# Patient Record
Sex: FEMALE | Race: OTHER | HISPANIC OR LATINO | Employment: UNEMPLOYED | URBAN - METROPOLITAN AREA
[De-identification: names, ages, dates, MRNs, and addresses within clinical notes are randomized per-mention and may not be internally consistent; named-entity substitution may affect disease eponyms.]

---

## 2023-03-27 PROBLEM — I10 HYPERTENSION: Status: ACTIVE | Noted: 2023-03-27

## 2023-03-27 PROBLEM — F32.A DEPRESSION: Status: ACTIVE | Noted: 2023-03-27

## 2023-04-18 ENCOUNTER — PATIENT MESSAGE (OUTPATIENT)
Dept: ADMINISTRATIVE | Facility: HOSPITAL | Age: 48
End: 2023-04-18

## 2023-05-18 PROBLEM — R31.29 HEMATURIA, MICROSCOPIC: Status: ACTIVE | Noted: 2023-05-18

## 2023-05-18 PROBLEM — R30.0 DYSURIA: Status: ACTIVE | Noted: 2023-05-18

## 2023-08-23 DIAGNOSIS — U07.1 COVID-19 VIRUS DETECTED: ICD-10-CM

## 2023-09-03 PROBLEM — R74.01 TRANSAMINITIS: Status: ACTIVE | Noted: 2023-09-03

## 2023-09-03 PROBLEM — Z87.442 HISTORY OF NEPHROLITHIASIS: Status: ACTIVE | Noted: 2023-09-03

## 2023-09-03 PROBLEM — N12 PYELONEPHRITIS: Status: ACTIVE | Noted: 2023-09-03

## 2023-09-03 PROBLEM — N20.0 BILATERAL NEPHROLITHIASIS: Status: ACTIVE | Noted: 2023-09-03

## 2023-09-03 PROBLEM — R30.0 DYSURIA: Status: RESOLVED | Noted: 2023-05-18 | Resolved: 2023-09-03

## 2023-09-05 PROBLEM — F55.8 ACETAMINOPHEN ABUSE: Status: ACTIVE | Noted: 2023-09-05

## 2023-09-06 PROBLEM — F55.8 ACETAMINOPHEN ABUSE: Status: RESOLVED | Noted: 2023-09-05 | Resolved: 2023-09-06

## 2023-09-06 PROBLEM — R78.81 BACTEREMIA: Status: ACTIVE | Noted: 2023-09-06

## 2023-10-27 ENCOUNTER — HOSPITAL ENCOUNTER (OUTPATIENT)
Facility: HOSPITAL | Age: 48
Discharge: HOME | End: 2023-10-29
Attending: STUDENT IN AN ORGANIZED HEALTH CARE EDUCATION/TRAINING PROGRAM | Admitting: STUDENT IN AN ORGANIZED HEALTH CARE EDUCATION/TRAINING PROGRAM

## 2023-10-27 DIAGNOSIS — R07.9 CHEST PAIN: ICD-10-CM

## 2023-10-27 DIAGNOSIS — R31.29 HEMATURIA, MICROSCOPIC: ICD-10-CM

## 2023-10-27 DIAGNOSIS — E66.09 CLASS 1 OBESITY DUE TO EXCESS CALORIES WITHOUT SERIOUS COMORBIDITY WITH BODY MASS INDEX (BMI) OF 30.0 TO 30.9 IN ADULT: ICD-10-CM

## 2023-10-27 DIAGNOSIS — R94.31 QT PROLONGATION: ICD-10-CM

## 2023-10-27 DIAGNOSIS — I10 HYPERTENSION, UNSPECIFIED TYPE: ICD-10-CM

## 2023-10-27 DIAGNOSIS — R74.01 TRANSAMINITIS: ICD-10-CM

## 2023-10-27 DIAGNOSIS — N20.0 NEPHROLITHIASIS: Primary | ICD-10-CM

## 2023-10-27 PROBLEM — E66.9 CLASS 1 OBESITY WITHOUT SERIOUS COMORBIDITY WITH BODY MASS INDEX (BMI) OF 30.0 TO 30.9 IN ADULT: Status: ACTIVE | Noted: 2023-10-27

## 2023-10-27 PROBLEM — E66.811 CLASS 1 OBESITY WITHOUT SERIOUS COMORBIDITY WITH BODY MASS INDEX (BMI) OF 30.0 TO 30.9 IN ADULT: Status: ACTIVE | Noted: 2023-10-27

## 2023-10-27 LAB
ALBUMIN SERPL BCP-MCNC: 3.8 G/DL (ref 3.5–5.2)
ALP SERPL-CCNC: 128 U/L (ref 55–135)
ALT SERPL W/O P-5'-P-CCNC: 110 U/L (ref 10–44)
ANION GAP SERPL CALC-SCNC: 9 MMOL/L (ref 8–16)
AST SERPL-CCNC: 69 U/L (ref 10–40)
BASOPHILS # BLD AUTO: 0.01 K/UL (ref 0–0.2)
BASOPHILS NFR BLD: 0.1 % (ref 0–1.9)
BILIRUB SERPL-MCNC: 0.8 MG/DL (ref 0.1–1)
BUN SERPL-MCNC: 11 MG/DL (ref 6–20)
CALCIUM SERPL-MCNC: 9.1 MG/DL (ref 8.7–10.5)
CHLORIDE SERPL-SCNC: 106 MMOL/L (ref 95–110)
CO2 SERPL-SCNC: 25 MMOL/L (ref 23–29)
CREAT SERPL-MCNC: 1 MG/DL (ref 0.5–1.4)
DIFFERENTIAL METHOD: ABNORMAL
EOSINOPHIL # BLD AUTO: 0.1 K/UL (ref 0–0.5)
EOSINOPHIL NFR BLD: 1.1 % (ref 0–8)
ERYTHROCYTE [DISTWIDTH] IN BLOOD BY AUTOMATED COUNT: 12.7 % (ref 11.5–14.5)
EST. GFR  (NO RACE VARIABLE): >60 ML/MIN/1.73 M^2
GLUCOSE SERPL-MCNC: 105 MG/DL (ref 70–110)
HAV IGM SERPL QL IA: NORMAL
HBV CORE IGM SERPL QL IA: NORMAL
HBV SURFACE AG SERPL QL IA: NORMAL
HCT VFR BLD AUTO: 39.8 % (ref 37–48.5)
HCV AB SERPL QL IA: NORMAL
HGB BLD-MCNC: 13 G/DL (ref 12–16)
IMM GRANULOCYTES # BLD AUTO: 0.03 K/UL (ref 0–0.04)
IMM GRANULOCYTES NFR BLD AUTO: 0.4 % (ref 0–0.5)
LYMPHOCYTES # BLD AUTO: 1.5 K/UL (ref 1–4.8)
LYMPHOCYTES NFR BLD: 18.5 % (ref 18–48)
MCH RBC QN AUTO: 29 PG (ref 27–31)
MCHC RBC AUTO-ENTMCNC: 32.7 G/DL (ref 32–36)
MCV RBC AUTO: 89 FL (ref 82–98)
MONOCYTES # BLD AUTO: 0.7 K/UL (ref 0.3–1)
MONOCYTES NFR BLD: 8.8 % (ref 4–15)
NEUTROPHILS # BLD AUTO: 5.8 K/UL (ref 1.8–7.7)
NEUTROPHILS NFR BLD: 71.1 % (ref 38–73)
NRBC BLD-RTO: 0 /100 WBC
PLATELET # BLD AUTO: 219 K/UL (ref 150–450)
PMV BLD AUTO: 9 FL (ref 9.2–12.9)
POTASSIUM SERPL-SCNC: 4.2 MMOL/L (ref 3.5–5.1)
PROT SERPL-MCNC: 7.5 G/DL (ref 6–8.4)
RBC # BLD AUTO: 4.49 M/UL (ref 4–5.4)
SODIUM SERPL-SCNC: 140 MMOL/L (ref 136–145)
WBC # BLD AUTO: 8.16 K/UL (ref 3.9–12.7)

## 2023-10-27 PROCEDURE — 80074 ACUTE HEPATITIS PANEL: CPT | Performed by: STUDENT IN AN ORGANIZED HEALTH CARE EDUCATION/TRAINING PROGRAM

## 2023-10-27 PROCEDURE — 85025 COMPLETE CBC W/AUTO DIFF WBC: CPT | Performed by: STUDENT IN AN ORGANIZED HEALTH CARE EDUCATION/TRAINING PROGRAM

## 2023-10-27 PROCEDURE — 36415 COLL VENOUS BLD VENIPUNCTURE: CPT | Performed by: STUDENT IN AN ORGANIZED HEALTH CARE EDUCATION/TRAINING PROGRAM

## 2023-10-27 PROCEDURE — 25000003 PHARM REV CODE 250: Performed by: UROLOGY

## 2023-10-27 PROCEDURE — G0378 HOSPITAL OBSERVATION PER HR: HCPCS

## 2023-10-27 PROCEDURE — 63600175 PHARM REV CODE 636 W HCPCS: Performed by: STUDENT IN AN ORGANIZED HEALTH CARE EDUCATION/TRAINING PROGRAM

## 2023-10-27 PROCEDURE — 96374 THER/PROPH/DIAG INJ IV PUSH: CPT | Mod: 59

## 2023-10-27 PROCEDURE — 96361 HYDRATE IV INFUSION ADD-ON: CPT

## 2023-10-27 PROCEDURE — 80053 COMPREHEN METABOLIC PANEL: CPT | Performed by: STUDENT IN AN ORGANIZED HEALTH CARE EDUCATION/TRAINING PROGRAM

## 2023-10-27 PROCEDURE — G0379 DIRECT REFER HOSPITAL OBSERV: HCPCS

## 2023-10-27 PROCEDURE — 25000003 PHARM REV CODE 250: Performed by: STUDENT IN AN ORGANIZED HEALTH CARE EDUCATION/TRAINING PROGRAM

## 2023-10-27 PROCEDURE — 99204 OFFICE O/P NEW MOD 45 MIN: CPT | Mod: ,,, | Performed by: UROLOGY

## 2023-10-27 PROCEDURE — 96375 TX/PRO/DX INJ NEW DRUG ADDON: CPT

## 2023-10-27 PROCEDURE — 99204 PR OFFICE/OUTPT VISIT, NEW, LEVL IV, 45-59 MIN: ICD-10-PCS | Mod: ,,, | Performed by: UROLOGY

## 2023-10-27 RX ORDER — KETOROLAC TROMETHAMINE 30 MG/ML
15 INJECTION, SOLUTION INTRAMUSCULAR; INTRAVENOUS ONCE
Status: COMPLETED | OUTPATIENT
Start: 2023-10-27 | End: 2023-10-27

## 2023-10-27 RX ORDER — SODIUM CHLORIDE 9 MG/ML
INJECTION, SOLUTION INTRAVENOUS CONTINUOUS
Status: DISCONTINUED | OUTPATIENT
Start: 2023-10-27 | End: 2023-10-29 | Stop reason: HOSPADM

## 2023-10-27 RX ORDER — NALOXONE HCL 0.4 MG/ML
0.02 VIAL (ML) INJECTION
Status: DISCONTINUED | OUTPATIENT
Start: 2023-10-27 | End: 2023-10-29 | Stop reason: HOSPADM

## 2023-10-27 RX ORDER — IBUPROFEN 200 MG
16 TABLET ORAL
Status: DISCONTINUED | OUTPATIENT
Start: 2023-10-27 | End: 2023-10-29 | Stop reason: HOSPADM

## 2023-10-27 RX ORDER — HYDROCODONE BITARTRATE AND ACETAMINOPHEN 5; 325 MG/1; MG/1
1 TABLET ORAL EVERY 6 HOURS PRN
Status: DISCONTINUED | OUTPATIENT
Start: 2023-10-27 | End: 2023-10-28

## 2023-10-27 RX ORDER — HYDROMORPHONE HYDROCHLORIDE 1 MG/ML
1 INJECTION, SOLUTION INTRAMUSCULAR; INTRAVENOUS; SUBCUTANEOUS ONCE
Status: COMPLETED | OUTPATIENT
Start: 2023-10-27 | End: 2023-10-27

## 2023-10-27 RX ORDER — TAMSULOSIN HYDROCHLORIDE 0.4 MG/1
0.4 CAPSULE ORAL DAILY
Status: DISCONTINUED | OUTPATIENT
Start: 2023-10-27 | End: 2023-10-29 | Stop reason: HOSPADM

## 2023-10-27 RX ORDER — SODIUM CHLORIDE 0.9 % (FLUSH) 0.9 %
10 SYRINGE (ML) INJECTION EVERY 12 HOURS PRN
Status: DISCONTINUED | OUTPATIENT
Start: 2023-10-27 | End: 2023-10-29 | Stop reason: HOSPADM

## 2023-10-27 RX ORDER — HYDROCODONE BITARTRATE AND ACETAMINOPHEN 10; 325 MG/1; MG/1
1 TABLET ORAL EVERY 6 HOURS PRN
Status: DISCONTINUED | OUTPATIENT
Start: 2023-10-27 | End: 2023-10-28

## 2023-10-27 RX ORDER — FLUOXETINE HYDROCHLORIDE 20 MG/1
20 CAPSULE ORAL DAILY
Status: DISCONTINUED | OUTPATIENT
Start: 2023-10-28 | End: 2023-10-29 | Stop reason: HOSPADM

## 2023-10-27 RX ORDER — IBUPROFEN 200 MG
24 TABLET ORAL
Status: DISCONTINUED | OUTPATIENT
Start: 2023-10-27 | End: 2023-10-29 | Stop reason: HOSPADM

## 2023-10-27 RX ORDER — HYDRALAZINE HYDROCHLORIDE 20 MG/ML
10 INJECTION INTRAMUSCULAR; INTRAVENOUS EVERY 4 HOURS PRN
Status: DISCONTINUED | OUTPATIENT
Start: 2023-10-27 | End: 2023-10-29 | Stop reason: HOSPADM

## 2023-10-27 RX ORDER — GLUCAGON 1 MG
1 KIT INJECTION
Status: DISCONTINUED | OUTPATIENT
Start: 2023-10-27 | End: 2023-10-29 | Stop reason: HOSPADM

## 2023-10-27 RX ADMIN — KETOROLAC TROMETHAMINE 15 MG: 30 INJECTION, SOLUTION INTRAMUSCULAR; INTRAVENOUS at 10:10

## 2023-10-27 RX ADMIN — HYDROCODONE BITARTRATE AND ACETAMINOPHEN 1 TABLET: 10; 325 TABLET ORAL at 05:10

## 2023-10-27 RX ADMIN — SODIUM CHLORIDE: 9 INJECTION, SOLUTION INTRAVENOUS at 12:10

## 2023-10-27 RX ADMIN — SODIUM CHLORIDE: 9 INJECTION, SOLUTION INTRAVENOUS at 10:10

## 2023-10-27 RX ADMIN — HYDROCODONE BITARTRATE AND ACETAMINOPHEN 1 TABLET: 10; 325 TABLET ORAL at 10:10

## 2023-10-27 RX ADMIN — TAMSULOSIN HYDROCHLORIDE 0.4 MG: 0.4 CAPSULE ORAL at 12:10

## 2023-10-27 RX ADMIN — HYDROMORPHONE HYDROCHLORIDE 1 MG: 1 INJECTION, SOLUTION INTRAMUSCULAR; INTRAVENOUS; SUBCUTANEOUS at 11:10

## 2023-10-27 NOTE — ASSESSMENT & PLAN NOTE
Patient has persistent depression which is mild and is currently controlled. Will Continue anti-depressant medications. We will not consult psychiatry at this time. Patient does not display psychosis at this time. Continue to monitor closely and adjust plan of care as needed.    -Resume home fluoxetine 20mg

## 2023-10-27 NOTE — ASSESSMENT & PLAN NOTE
-home meds: lisinopril-HCTZ 20-12.5mg  -BP controlled at this time  -Will hold home meds.  -Monitor BP

## 2023-10-27 NOTE — ASSESSMENT & PLAN NOTE
Body mass index is 30.03 kg/m². Morbid obesity complicates all aspects of disease management from diagnostic modalities to treatment. Weight loss encouraged and health benefits explained to patient.

## 2023-10-27 NOTE — SUBJECTIVE & OBJECTIVE
Past Medical History:   Diagnosis Date    GERD (gastroesophageal reflux disease)     History of COVID-19     Hypertension     Nephrolithiasis     Renal disorder     Kidney Stones       Past Surgical History:   Procedure Laterality Date    BILATERAL TUBAL LIGATION       SECTION      CHOLECYSTECTOMY      COLONOSCOPY N/A 2023    Procedure: COLONOSCOPY;  Surgeon: Tristen Manrique MD;  Location: [facility];  Service: Endoscopy;  Laterality: N/A;    EXTRACORPOREAL SHOCK WAVE LITHOTRIPSY Right 2022    Procedure: LITHOTRIPSY, ESWL;  Surgeon: Piter Mendieta II, MD;  Location: [facility];  Service: Urology;  Laterality: Right;    TUBAL LIGATION         Review of patient's allergies indicates:   Allergen Reactions    Ampicillin     Penicillins Other (See Comments)     weakness       Family History       Problem Relation (Age of Onset)    Endometrial cancer Mother    Hypertension Sister    No Known Problems Father    Ovarian cancer Maternal Aunt    Stomach cancer Mother            Tobacco Use    Smoking status: Never    Smokeless tobacco: Never   Substance and Sexual Activity    Alcohol use: No    Drug use: No    Sexual activity: Not Currently       Review of Systems   Constitutional:  Negative for chills, fatigue and fever.   HENT:  Negative for congestion.    Respiratory:  Negative for chest tightness and shortness of breath.    Cardiovascular:  Negative for chest pain.   Gastrointestinal:  Positive for nausea and vomiting. Negative for abdominal distention, constipation and diarrhea.   Genitourinary:  Positive for flank pain. Negative for difficulty urinating, dysuria, frequency, hematuria, pelvic pain and urgency.   Musculoskeletal:  Negative for arthralgias.   Skin:  Negative for rash.   Neurological:  Negative for dizziness.   Psychiatric/Behavioral:  Negative for confusion.        Objective:     Temp:  [97.8 °F (36.6 °C)-100.1 °F (37.8 °C)] 97.8 °F (36.6 °C)  Pulse:  [] 86  Resp:  [12-22]  "20  SpO2:  [95 %-99 %] 96 %  BP: (130-183)/() 145/63  Weight: 72.1 kg (158 lb 15.2 oz)  Body mass index is 30.03 kg/m².           Drains       None                    Physical Exam  Vitals and nursing note reviewed.   Constitutional:       Appearance: She is well-developed.   HENT:      Head: Normocephalic.   Eyes:      Conjunctiva/sclera: Conjunctivae normal.   Neck:      Thyroid: No thyromegaly.      Trachea: No tracheal deviation.   Cardiovascular:      Rate and Rhythm: Normal rate.      Pulses: Normal pulses.      Heart sounds: Normal heart sounds.   Pulmonary:      Effort: Pulmonary effort is normal. No respiratory distress.      Breath sounds: Normal breath sounds. No wheezing.   Abdominal:      General: There is no distension.      Palpations: Abdomen is soft. There is no mass.      Tenderness: There is no abdominal tenderness. There is no guarding or rebound.      Hernia: No hernia is present.   Musculoskeletal:         General: No tenderness. Normal range of motion.      Cervical back: Normal range of motion.   Lymphadenopathy:      Cervical: No cervical adenopathy.   Skin:     General: Skin is warm and dry.      Findings: No erythema or rash.   Neurological:      Mental Status: She is alert and oriented to person, place, and time.   Psychiatric:         Behavior: Behavior normal.         Thought Content: Thought content normal.         Judgment: Judgment normal.          Significant Labs:    BMP:  Recent Labs   Lab 10/26/23  1615 10/27/23  0949    140   K 3.7 4.2    106   CO2 24 25   BUN 13 11   CREATININE 0.8 1.0   CALCIUM 9.5 9.1       CBC:  Recent Labs   Lab 10/26/23  1615 10/27/23  0949   WBC 11.57 8.16   HGB 13.6 13.0   HCT 40.6 39.8    219       Blood Culture: No results for input(s): "LABBLOO" in the last 168 hours.  Urine Culture: No results for input(s): "LABURIN" in the last 168 hours.    Significant Imaging:  CT: I have reviewed all results within the past 24 hours and " my personal findings are:  Left hydronephrosis secondary to a distal left 5 mm stone

## 2023-10-27 NOTE — NURSING
Patient arrived to floor via stretcher. NAD noted. Safety precautions maintained. Plan of care ongoing.

## 2023-10-27 NOTE — HPI
Urolithiasis  Patient complains of left abdominal pain with radiation to the abdomen. Onset of symptoms was abrupt starting 2 days ago with controlled course since that time. Patient describes the pain as aching and colicky,  intermittent  and rated as mild and moderate. The patient has had vomiting and no diaphoresis. There has been no fever or chills. The patient is not complaining of dysuria or frequency. Risk factors for urolithiasis: history of stone disease.   Patient transferred from [facility] due to pain.  She currently is pain free.   used.

## 2023-10-27 NOTE — CONSULTS
[facility] [place]  [place]rology  Consult Note    Patient Name: Hue Cruz  MRN: [id number]  Admission Date: 10/27/2023  Hospital Length of Stay: 0   Code Status: Full Code   Attending Provider: Purvi Moore DO   Consulting Provider: Yayo Birmingham MD  Primary Care Physician: Chloé Davis FNP  Principal Problem:Nephrolithiasis    Inpatient consult to Urology  Consult performed by: Yayo Birmingham MD  Consult ordered by: Purvi Moore DO          Subjective:     HPI:  Urolithiasis  Patient complains of left abdominal pain with radiation to the abdomen. Onset of symptoms was abrupt starting 2 days ago with controlled course since that time. Patient describes the pain as aching and colicky,  intermittent  and rated as mild and moderate. The patient has had vomiting and no diaphoresis. There has been no fever or chills. The patient is not complaining of dysuria or frequency. Risk factors for urolithiasis: history of stone disease.   Patient transferred from [facility] due to pain.  She currently is pain free.   used.      Past Medical History:   Diagnosis Date    GERD (gastroesophageal reflux disease)     History of COVID-19     Hypertension     Nephrolithiasis     Renal disorder     Kidney Stones       Past Surgical History:   Procedure Laterality Date    BILATERAL TUBAL LIGATION       SECTION      CHOLECYSTECTOMY      COLONOSCOPY N/A 2023    Procedure: COLONOSCOPY;  Surgeon: Tristen Manrique MD;  Location: [facility];  Service: Endoscopy;  Laterality: N/A;    EXTRACORPOREAL SHOCK WAVE LITHOTRIPSY Right 2022    Procedure: LITHOTRIPSY, ESWL;  Surgeon: Piter Mendieta II, MD;  Location: [facility];  Service: Urology;  Laterality: Right;    TUBAL LIGATION         Review of patient's allergies indicates:   Allergen Reactions    Ampicillin     Penicillins Other (See Comments)     weakness       Family History       Problem  Relation (Age of Onset)    Endometrial cancer Mother    Hypertension Sister    No Known Problems Father    Ovarian cancer Maternal Aunt    Stomach cancer Mother            Tobacco Use    Smoking status: Never    Smokeless tobacco: Never   Substance and Sexual Activity    Alcohol use: No    Drug use: No    Sexual activity: Not Currently       Review of Systems   Constitutional:  Negative for chills, fatigue and fever.   HENT:  Negative for congestion.    Respiratory:  Negative for chest tightness and shortness of breath.    Cardiovascular:  Negative for chest pain.   Gastrointestinal:  Positive for nausea and vomiting. Negative for abdominal distention, constipation and diarrhea.   Genitourinary:  Positive for flank pain. Negative for difficulty urinating, dysuria, frequency, hematuria, pelvic pain and urgency.   Musculoskeletal:  Negative for arthralgias.   Skin:  Negative for rash.   Neurological:  Negative for dizziness.   Psychiatric/Behavioral:  Negative for confusion.        Objective:     Temp:  [97.8 °F (36.6 °C)-100.1 °F (37.8 °C)] 97.8 °F (36.6 °C)  Pulse:  [] 86  Resp:  [12-22] 20  SpO2:  [95 %-99 %] 96 %  BP: (130-183)/() 145/63  Weight: 72.1 kg (158 lb 15.2 oz)  Body mass index is 30.03 kg/m².           Drains       None                    Physical Exam  Vitals and nursing note reviewed.   Constitutional:       Appearance: She is well-developed.   HENT:      Head: Normocephalic.   Eyes:      Conjunctiva/sclera: Conjunctivae normal.   Neck:      Thyroid: No thyromegaly.      Trachea: No tracheal deviation.   Cardiovascular:      Rate and Rhythm: Normal rate.      Pulses: Normal pulses.      Heart sounds: Normal heart sounds.   Pulmonary:      Effort: Pulmonary effort is normal. No respiratory distress.      Breath sounds: Normal breath sounds. No wheezing.   Abdominal:      General: There is no distension.      Palpations: Abdomen is soft. There is no mass.      Tenderness: There is no  "abdominal tenderness. There is no guarding or rebound.      Hernia: No hernia is present.   Musculoskeletal:         General: No tenderness. Normal range of motion.      Cervical back: Normal range of motion.   Lymphadenopathy:      Cervical: No cervical adenopathy.   Skin:     General: Skin is warm and dry.      Findings: No erythema or rash.   Neurological:      Mental Status: She is alert and oriented to person, place, and time.   Psychiatric:         Behavior: Behavior normal.         Thought Content: Thought content normal.         Judgment: Judgment normal.          Significant Labs:    BMP:  Recent Labs   Lab 10/26/23  1615 10/27/23  0949    140   K 3.7 4.2    106   CO2 24 25   BUN 13 11   CREATININE 0.8 1.0   CALCIUM 9.5 9.1       CBC:  Recent Labs   Lab 10/26/23  1615 10/27/23  0949   WBC 11.57 8.16   HGB 13.6 13.0   HCT 40.6 39.8    219       Blood Culture: No results for input(s): "LABBLOO" in the last 168 hours.  Urine Culture: No results for input(s): "LABURIN" in the last 168 hours.    Significant Imaging:  CT: I have reviewed all results within the past 24 hours and my personal findings are:  Left hydronephrosis secondary to a distal left 5 mm stone                      Assessment and Plan:     * Nephrolithiasis  Okay to eat today  IVF  Flomax  Likely will be able to pass this stone  Strain urine  NPO after midnight in case procedure is needed        VTE Risk Mitigation (From admission, onward)         Ordered     IP VTE HIGH RISK PATIENT  Once         10/27/23 0935     Place sequential compression device  Until discontinued         10/27/23 0935                Thank you for your consult. I will follow-up with patient. Please contact us if you have any additional questions.    Yayo Birmingham MD  Urology  [facility] - Telemetry  "

## 2023-10-27 NOTE — NURSING
[facility]  Nurses Note -- 4 Eyes      10/27/2023       Skin assessed on: Admit      [x] No Pressure Injuries Present    []Prevention Measures Documented    [] Yes LDA  for Pressure Injury Previously documented     [] Yes New Pressure Injury Discovered   [] LDA for New Pressure Injury Added      Attending :  Jessica Prajapati LPN     Second RN:  Mahamed GONZALEZ

## 2023-10-27 NOTE — HPI
48-year-old female who past medical history of hypertension, GERD, nephrolithiasis transferred to [facility] from [facility] for nephrolithiasis, left hydronephrosis, and transaminitis.  Patient complained of left flank pain radiating to left lower abdomen for the past 2 days.  Pain is sharp, constant, and associated with nausea and vomiting.  Had decreased p.o. intake.  Denies any dysuria, increasing urinary urgency or frequency, difficulty urinating.    CT abdomen/pelvis showing 6 mm distal left ureteral calculus with associated left sided mild hydronephrosis and hydroureter.  2-3 mm nonobstructing right renal calculus. Patient given fluids, flomax, analgesics in the ED with minimal relief.  Patient admitted to hospital medicine for further evaluation and urology consult.     line use.  : Dona [id number]

## 2023-10-27 NOTE — ASSESSMENT & PLAN NOTE
Okay to eat today  IVF  Flomax  Likely will be able to pass this stone  Strain urine  NPO after midnight in case procedure is needed

## 2023-10-27 NOTE — H&P
[facility] Medicine  History & Physical    Patient Name: Hue Cruz  MRN: [id number]  Patient Class: OP- Observation  Admission Date: 10/27/2023  Attending Physician: Purvi Moore DO   Primary Care Provider: Chloé Davis FNP         Patient information was obtained from patient and ER records.     Subjective:     Principal Problem:Nephrolithiasis    Chief Complaint: No chief complaint on file.       HPI:  48-year-old female who past medical history of hypertension, GERD, nephrolithiasis transferred to [facility] from [facility] for nephrolithiasis, left hydronephrosis, and transaminitis.  Patient complained of left flank pain radiating to left lower abdomen for the past 2 days.  Pain is sharp, constant, and associated with nausea and vomiting.  Had decreased p.o. intake.  Denies any dysuria, increasing urinary urgency or frequency, difficulty urinating.    CT abdomen/pelvis showing 6 mm distal left ureteral calculus with associated left sided mild hydronephrosis and hydroureter.  2-3 mm nonobstructing right renal calculus. Patient given fluids, flomax, analgesics in the ED with minimal relief.  Patient admitted to hospital medicine for further evaluation and urology consult.     line use.  : [employer] [id number]      Past Medical History:   Diagnosis Date    GERD (gastroesophageal reflux disease)     History of COVID-19     Hypertension     Nephrolithiasis     Renal disorder     Kidney Stones       Past Surgical History:   Procedure Laterality Date    BILATERAL TUBAL LIGATION       SECTION      CHOLECYSTECTOMY      COLONOSCOPY N/A 2023    Procedure: COLONOSCOPY;  Surgeon: Tristen Manrique MD;  [facility];  Service: Endoscopy;  Laterality: N/A;    EXTRACORPOREAL SHOCK WAVE LITHOTRIPSY Right 2022    Procedure: LITHOTRIPSY, ESWL;  Surgeon: Piter Mendieta II, MD;  Location: [facility];  Service: Urology;   Laterality: Right;    TUBAL LIGATION         Review of patient's allergies indicates:   Allergen Reactions    Ampicillin     Penicillins Other (See Comments)     weakness       Current Facility-Administered Medications on File Prior to Encounter   Medication    [COMPLETED] 0.9%  NaCl infusion    [COMPLETED] 0.9%  NaCl infusion    [COMPLETED] HYDROcodone-acetaminophen 5-325 mg per tablet 1 tablet    [COMPLETED] HYDROmorphone (PF) injection 1 mg    [COMPLETED] HYDROmorphone (PF) injection 1 mg    [COMPLETED] iohexoL (OMNIPAQUE 350) injection 80 mL    [COMPLETED] ketorolac injection 15 mg    [COMPLETED] ondansetron injection 4 mg    [COMPLETED] promethazine (PHENERGAN) 25 mg in dextrose 5 % (D5W) 50 mL IVPB    [COMPLETED] tamsulosin 24 hr capsule 0.4 mg    [DISCONTINUED] HYDROmorphone (PF) injection 1 mg    [DISCONTINUED] oxyCODONE-acetaminophen 5-325 mg per tablet 1 tablet     Current Outpatient Medications on File Prior to Encounter   Medication Sig    FLUoxetine 20 MG capsule Take 1 capsule (20 mg total) by mouth once daily. for 14 days    lisinopriL-hydrochlorothiazide (PRINZIDE,ZESTORETIC) 20-12.5 mg per tablet Take 1 tablet by mouth once daily. Hold until seen by PCP in 1-2 weeks d/t acute illness.     Family History       Problem Relation (Age of Onset)    Endometrial cancer Mother    Hypertension Sister    No Known Problems Father    Ovarian cancer Maternal Aunt    Stomach cancer Mother          Tobacco Use    Smoking status: Never    Smokeless tobacco: Never   Substance and Sexual Activity    Alcohol use: No    Drug use: No    Sexual activity: Not Currently     Review of Systems   Constitutional:  Positive for activity change and appetite change. Negative for chills, fatigue and fever.   Respiratory:  Negative for cough and shortness of breath.    Cardiovascular:  Negative for chest pain, palpitations and leg swelling.   Gastrointestinal:  Positive for abdominal pain, nausea and vomiting.  Negative for abdominal distention, blood in stool and diarrhea.   Genitourinary:  Positive for flank pain and hematuria. Negative for difficulty urinating and dysuria.   Musculoskeletal:  Positive for back pain.   Neurological:  Negative for dizziness, weakness and headaches.     Objective:     Vital Signs (Most Recent):  Temp: 97.8 °F (36.6 °C) (10/27/23 1115)  Pulse: 86 (10/27/23 1115)  Resp: 20 (10/27/23 1115)  BP: (!) 145/63 (10/27/23 1115)  SpO2: 96 % (10/27/23 1115) Vital Signs (24h Range):  Temp:  [97.8 °F (36.6 °C)-100.1 °F (37.8 °C)] 97.8 °F (36.6 °C)  Pulse:  [] 86  Resp:  [12-22] 20  SpO2:  [95 %-99 %] 96 %  BP: (130-183)/() 145/63        There is no height or weight on file to calculate BMI.     Physical Exam  Vitals and nursing note reviewed.   Constitutional:       General: She is not in acute distress.     Appearance: She is obese. She is not ill-appearing.   HENT:      Mouth/Throat:      Mouth: Mucous membranes are dry.   Eyes:      Extraocular Movements: Extraocular movements intact.   Cardiovascular:      Rate and Rhythm: Normal rate and regular rhythm.      Heart sounds: No murmur heard.  Pulmonary:      Effort: Pulmonary effort is normal. No respiratory distress.      Breath sounds: Normal breath sounds. No wheezing.   Abdominal:      General: There is no distension.      Palpations: Abdomen is soft.      Tenderness: There is no abdominal tenderness. There is left CVA tenderness. There is no right CVA tenderness.   Musculoskeletal:         General: No swelling or tenderness.   Skin:     General: Skin is warm and dry.   Neurological:      General: No focal deficit present.      Mental Status: She is alert and oriented to person, place, and time.   Psychiatric:         Thought Content: Thought content normal.                Significant Labs: All pertinent labs within the past 24 hours have been reviewed.  A1C:   Recent Labs   Lab 09/03/23 2008   HGBA1C 5.4     Bilirubin:   Recent Labs    Lab 10/26/23  1615 10/27/23  0949   BILITOT 0.5 0.8     CBC:   Recent Labs   Lab 10/26/23  1615 10/27/23  0949   WBC 11.57 8.16   HGB 13.6 13.0   HCT 40.6 39.8    219     CMP:   Recent Labs   Lab 10/26/23  1615 10/27/23  0949    140   K 3.7 4.2    106   CO2 24 25    105   BUN 13 11   CREATININE 0.8 1.0   CALCIUM 9.5 9.1   PROT 8.1 7.5   ALBUMIN 4.2 3.8   BILITOT 0.5 0.8   ALKPHOS 138* 128   AST 69* 69*   * 110*   ANIONGAP 10 9     Lipase:   Recent Labs   Lab 10/26/23  1615   LIPASE 41       Urine Studies:   Recent Labs   Lab 10/26/23  1620   COLORU Yellow   APPEARANCEUA Hazy*   PHUR 6.0   SPECGRAV 1.020   PROTEINUA 1+*   GLUCUA Negative   KETONESU Negative   BILIRUBINUA Negative   OCCULTUA 3+*   NITRITE Negative   LEUKOCYTESUR Negative   RBCUA 80*   WBCUA 3   BACTERIA Occasional   SQUAMEPITHEL 5   HYALINECASTS 0       Significant Imaging: I have reviewed all pertinent imaging results/findings within the past 24 hours.      CT Urogram Abd Pelvis W WO  Order: [id number]   Status: Final result      Visible to patient: Yes (not seen)      Next appt: None      0 Result Notes  Details    Reading Physician Reading Date Result Priority   John Lawton MD  [phone] 10/26/2023 STAT     Narrative & Impression  EXAMINATION:  CT UROGRAM ABD PELVIS W WO     CLINICAL HISTORY:  LLQ abdominal pain;Nausea/vomiting;     TECHNIQUE:  5 mm contiguous axial images are performed through the abdomen and pelvis prior to and following the administration of IV contrast.  Multiplanar reconstruction is performed     COMPARISON:  The 09/03/2023 the     FINDINGS:  CT scan abdomen:     Scans through lung bases are unremarkable.     Noncontrast scans through the abdomen show diffuse fatty infiltration of the normal sized liver the patient is post cholecystectomy.     The right kidney is normal in size with nonobstructing 3 mm calculus in lower pole.     There is mild left hydronephrosis and hydroureter,  nonobstructing 2 mm calculi seen within upper pole.     The dilated left ureter is followed distally where a sub 6-7 mm ureteral calculus is seen.     Following the administration of IV contrast examination of the upper abdomen show the stomach, spleen, pancreas, and adrenal glands to be normal in size and shape.     There is normal contrast enhancement of the right kidney.  Faint nephrogram consistent with obstruction noted in left kidney.     CT scan pelvis     Delayed scans through the entirety of abdomen and pelvis show a normal caliber right ureter.  No left pyelogram is present 10 minutes post contrast injection.     The uterus and both ovaries appear normal in size.     There is evidence of prior lower midline abdominal surgery.     A normal appearing appendix is seen within the right lower quadrant.  The large and small bowel are normal in caliber.     Impression:     1.  The 6 mm distal left ureteral calculus with associated mild hydronephrosis and hydroureter     2.  2-3 mm nonobstructing right renal calculus     3.  Fatty infiltration of liver        Electronically signed by: John Lawton MD  Date:                                            10/26/2023  Time:                                           18:10           Exam Ended: 10/26/23 17:43 Last Resulted: 10/26/23 18:10           Assessment/Plan:     * Nephrolithiasis  -presented with 2 days of left flank pain associated with nausea vomiting.  No fevers or UTI symptoms.  -CT renal study: The 6 mm distal left ureteral calculus with associated mild hydronephrosis and hydroureter. 2-3 mm nonobstructing right renal calculus  -Continue IVF and flomax  -Pain control  -Urology consulted      Transaminitis  -AST/ALT elevated at 69/112 on admission  -CT urogram showed fatty liver  -Hepatitis panel ordered  -Monitor LFTs    Hypertension  -home meds: lisinopril-HCTZ 20-12.5mg  -BP controlled at this time  -Will hold home meds.  -Monitor BP    Depression  Patient  has persistent depression which is mild and is currently controlled. Will Continue anti-depressant medications. We will not consult psychiatry at this time. Patient does not display psychosis at this time. Continue to monitor closely and adjust plan of care as needed.    -Resume home fluoxetine 20mg     Class 1 obesity without serious comorbidity with body mass index (BMI) of 30.0 to 30.9 in adult  Body mass index is 30.03 kg/m². Morbid obesity complicates all aspects of disease management from diagnostic modalities to treatment. Weight loss encouraged and health benefits explained to patient.           VTE Risk Mitigation (From admission, onward)         Ordered     IP VTE HIGH RISK PATIENT  Once         10/27/23 0935     Place sequential compression device  Until discontinued         10/27/23 0935                   On 10/27/2023, patient should be placed in hospital observation services under my care.            Purvi Moore DO  Department of Hospital Medicine  [facility] - Telemetry    COMPLETED  Family history non-contributory to current problem   Pertinent information:

## 2023-10-27 NOTE — SUBJECTIVE & OBJECTIVE
Past Medical History:   Diagnosis Date    GERD (gastroesophageal reflux disease)     History of COVID-19     Hypertension     Nephrolithiasis     Renal disorder     Kidney Stones       Past Surgical History:   Procedure Laterality Date    BILATERAL TUBAL LIGATION       SECTION      CHOLECYSTECTOMY      COLONOSCOPY N/A 2023    Procedure: COLONOSCOPY;  Surgeon: Tristen Manrique MD;  Location: [facility];  Service: Endoscopy;  Laterality: N/A;    EXTRACORPOREAL SHOCK WAVE LITHOTRIPSY Right 2022    Procedure: LITHOTRIPSY, ESWL;  Surgeon: Piter Mendieta II, MD;  Location: [facility];  Service: Urology;  Laterality: Right;    TUBAL LIGATION         Review of patient's allergies indicates:   Allergen Reactions    Ampicillin     Penicillins Other (See Comments)     weakness       Current Facility-Administered Medications on File Prior to Encounter   Medication    [COMPLETED] 0.9%  NaCl infusion    [COMPLETED] 0.9%  NaCl infusion    [COMPLETED] HYDROcodone-acetaminophen 5-325 mg per tablet 1 tablet    [COMPLETED] HYDROmorphone (PF) injection 1 mg    [COMPLETED] HYDROmorphone (PF) injection 1 mg    [COMPLETED] iohexoL (OMNIPAQUE 350) injection 80 mL    [COMPLETED] ketorolac injection 15 mg    [COMPLETED] ondansetron injection 4 mg    [COMPLETED] promethazine (PHENERGAN) 25 mg in dextrose 5 % (D5W) 50 mL IVPB    [COMPLETED] tamsulosin 24 hr capsule 0.4 mg    [DISCONTINUED] HYDROmorphone (PF) injection 1 mg    [DISCONTINUED] oxyCODONE-acetaminophen 5-325 mg per tablet 1 tablet     Current Outpatient Medications on File Prior to Encounter   Medication Sig    FLUoxetine 20 MG capsule Take 1 capsule (20 mg total) by mouth once daily. for 14 days    lisinopriL-hydrochlorothiazide (PRINZIDE,ZESTORETIC) 20-12.5 mg per tablet Take 1 tablet by mouth once daily. Hold until seen by PCP in 1-2 weeks d/t acute illness.     Family History       Problem Relation (Age of Onset)    Endometrial cancer Mother     Hypertension Sister    No Known Problems Father    Ovarian cancer Maternal Aunt    Stomach cancer Mother          Tobacco Use    Smoking status: Never    Smokeless tobacco: Never   Substance and Sexual Activity    Alcohol use: No    Drug use: No    Sexual activity: Not Currently     Review of Systems   Constitutional:  Positive for activity change and appetite change. Negative for chills, fatigue and fever.   Respiratory:  Negative for cough and shortness of breath.    Cardiovascular:  Negative for chest pain, palpitations and leg swelling.   Gastrointestinal:  Positive for abdominal pain, nausea and vomiting. Negative for abdominal distention, blood in stool and diarrhea.   Genitourinary:  Positive for flank pain and hematuria. Negative for difficulty urinating and dysuria.   Musculoskeletal:  Positive for back pain.   Neurological:  Negative for dizziness, weakness and headaches.     Objective:     Vital Signs (Most Recent):  Temp: 97.8 °F (36.6 °C) (10/27/23 1115)  Pulse: 86 (10/27/23 1115)  Resp: 20 (10/27/23 1115)  BP: (!) 145/63 (10/27/23 1115)  SpO2: 96 % (10/27/23 1115) Vital Signs (24h Range):  Temp:  [97.8 °F (36.6 °C)-100.1 °F (37.8 °C)] 97.8 °F (36.6 °C)  Pulse:  [] 86  Resp:  [12-22] 20  SpO2:  [95 %-99 %] 96 %  BP: (130-183)/() 145/63        There is no height or weight on file to calculate BMI.     Physical Exam  Vitals and nursing note reviewed.   Constitutional:       General: She is not in acute distress.     Appearance: She is obese. She is not ill-appearing.   HENT:      Mouth/Throat:      Mouth: Mucous membranes are dry.   Eyes:      Extraocular Movements: Extraocular movements intact.   Cardiovascular:      Rate and Rhythm: Normal rate and regular rhythm.      Heart sounds: No murmur heard.  Pulmonary:      Effort: Pulmonary effort is normal. No respiratory distress.      Breath sounds: Normal breath sounds. No wheezing.   Abdominal:      General: There is no distension.       Palpations: Abdomen is soft.      Tenderness: There is no abdominal tenderness. There is left CVA tenderness. There is no right CVA tenderness.   Musculoskeletal:         General: No swelling or tenderness.   Skin:     General: Skin is warm and dry.   Neurological:      General: No focal deficit present.      Mental Status: She is alert and oriented to person, place, and time.   Psychiatric:         Thought Content: Thought content normal.                Significant Labs: All pertinent labs within the past 24 hours have been reviewed.  A1C:   Recent Labs   Lab 09/03/23 2008   HGBA1C 5.4     Bilirubin:   Recent Labs   Lab 10/26/23  1615 10/27/23  0949   BILITOT 0.5 0.8     CBC:   Recent Labs   Lab 10/26/23  1615 10/27/23  0949   WBC 11.57 8.16   HGB 13.6 13.0   HCT 40.6 39.8    219     CMP:   Recent Labs   Lab 10/26/23  1615 10/27/23  0949    140   K 3.7 4.2    106   CO2 24 25    105   BUN 13 11   CREATININE 0.8 1.0   CALCIUM 9.5 9.1   PROT 8.1 7.5   ALBUMIN 4.2 3.8   BILITOT 0.5 0.8   ALKPHOS 138* 128   AST 69* 69*   * 110*   ANIONGAP 10 9     Lipase:   Recent Labs   Lab 10/26/23  1615   LIPASE 41       Urine Studies:   Recent Labs   Lab 10/26/23  1620   COLORU Yellow   APPEARANCEUA Hazy*   PHUR 6.0   SPECGRAV 1.020   PROTEINUA 1+*   GLUCUA Negative   KETONESU Negative   BILIRUBINUA Negative   OCCULTUA 3+*   NITRITE Negative   LEUKOCYTESUR Negative   RBCUA 80*   WBCUA 3   BACTERIA Occasional   SQUAMEPITHEL 5   HYALINECASTS 0       Significant Imaging: I have reviewed all pertinent imaging results/findings within the past 24 hours.      CT Urogram Abd Pelvis W WO  Order: [id number]  Status: Final result     Visible to patient: Yes (not seen)     Next appt: None     0 Result Notes  Details    Reading Physician Reading Date Result Priority   John Lawton MD  [phone] 10/26/2023 STAT     Narrative & Impression  EXAMINATION:  CT UROGRAM ABD PELVIS W WO     CLINICAL HISTORY:  LLQ  abdominal pain;Nausea/vomiting;     TECHNIQUE:  5 mm contiguous axial images are performed through the abdomen and pelvis prior to and following the administration of IV contrast.  Multiplanar reconstruction is performed     COMPARISON:  The 09/03/2023 the     FINDINGS:  CT scan abdomen:     Scans through lung bases are unremarkable.     Noncontrast scans through the abdomen show diffuse fatty infiltration of the normal sized liver the patient is post cholecystectomy.     The right kidney is normal in size with nonobstructing 3 mm calculus in lower pole.     There is mild left hydronephrosis and hydroureter, nonobstructing 2 mm calculi seen within upper pole.     The dilated left ureter is followed distally where a sub 6-7 mm ureteral calculus is seen.     Following the administration of IV contrast examination of the upper abdomen show the stomach, spleen, pancreas, and adrenal glands to be normal in size and shape.     There is normal contrast enhancement of the right kidney.  Faint nephrogram consistent with obstruction noted in left kidney.     CT scan pelvis     Delayed scans through the entirety of abdomen and pelvis show a normal caliber right ureter.  No left pyelogram is present 10 minutes post contrast injection.     The uterus and both ovaries appear normal in size.     There is evidence of prior lower midline abdominal surgery.     A normal appearing appendix is seen within the right lower quadrant.  The large and small bowel are normal in caliber.     Impression:     1.  The 6 mm distal left ureteral calculus with associated mild hydronephrosis and hydroureter     2.  2-3 mm nonobstructing right renal calculus     3.  Fatty infiltration of liver        Electronically signed by: John Lawton MD  Date:                                            10/26/2023  Time:                                           18:10           Exam Ended: 10/26/23 17:43 Last Resulted: 10/26/23 18:10

## 2023-10-27 NOTE — ASSESSMENT & PLAN NOTE
-presented with 2 days of left flank pain associated with nausea vomiting.  No fevers or UTI symptoms.  -CT renal study: The 6 mm distal left ureteral calculus with associated mild hydronephrosis and hydroureter. 2-3 mm nonobstructing right renal calculus  -Continue IVF and flomax  -Pain control  -Urology consulted

## 2023-10-27 NOTE — ASSESSMENT & PLAN NOTE
-AST/ALT elevated at 69/112 on admission  -CT urogram showed fatty liver  -Hepatitis panel ordered  -Monitor LFTs

## 2023-10-27 NOTE — PLAN OF CARE
Problem: Adult Inpatient Plan of Care  Goal: Plan of Care Review  Outcome: Ongoing, Progressing  Goal: Optimal Comfort and Wellbeing  Outcome: Ongoing, Progressing  Goal: Readiness for Transition of Care  Outcome: Ongoing, Progressing     Problem: Adult Inpatient Plan of Care  Goal: Plan of Care Review  Outcome: Ongoing, Progressing     Problem: Adult Inpatient Plan of Care  Goal: Readiness for Transition of Care  Outcome: Ongoing, Progressing

## 2023-10-28 ENCOUNTER — ANESTHESIA EVENT (OUTPATIENT)
Dept: SURGERY | Facility: HOSPITAL | Age: 48
End: 2023-10-28

## 2023-10-28 ENCOUNTER — ANESTHESIA (OUTPATIENT)
Dept: SURGERY | Facility: HOSPITAL | Age: 48
End: 2023-10-28

## 2023-10-28 LAB
ALBUMIN SERPL BCP-MCNC: 3.2 G/DL (ref 3.5–5.2)
ALP SERPL-CCNC: 106 U/L (ref 55–135)
ALT SERPL W/O P-5'-P-CCNC: 76 U/L (ref 10–44)
ANION GAP SERPL CALC-SCNC: 7 MMOL/L (ref 8–16)
AST SERPL-CCNC: 45 U/L (ref 10–40)
BILIRUB SERPL-MCNC: 0.5 MG/DL (ref 0.1–1)
BUN SERPL-MCNC: 15 MG/DL (ref 6–20)
CALCIUM SERPL-MCNC: 8.6 MG/DL (ref 8.7–10.5)
CHLORIDE SERPL-SCNC: 109 MMOL/L (ref 95–110)
CO2 SERPL-SCNC: 24 MMOL/L (ref 23–29)
CREAT SERPL-MCNC: 0.8 MG/DL (ref 0.5–1.4)
EST. GFR  (NO RACE VARIABLE): >60 ML/MIN/1.73 M^2
GLUCOSE SERPL-MCNC: 110 MG/DL (ref 70–110)
POTASSIUM SERPL-SCNC: 3.8 MMOL/L (ref 3.5–5.1)
PROT SERPL-MCNC: 6.6 G/DL (ref 6–8.4)
SODIUM SERPL-SCNC: 140 MMOL/L (ref 136–145)

## 2023-10-28 PROCEDURE — 96361 HYDRATE IV INFUSION ADD-ON: CPT

## 2023-10-28 PROCEDURE — 99214 OFFICE O/P EST MOD 30 MIN: CPT | Mod: 25,,, | Performed by: UROLOGY

## 2023-10-28 PROCEDURE — D9220A PRA ANESTHESIA: ICD-10-PCS | Mod: ,,, | Performed by: ANESTHESIOLOGY

## 2023-10-28 PROCEDURE — 63600175 PHARM REV CODE 636 W HCPCS: Performed by: NURSE ANESTHETIST, CERTIFIED REGISTERED

## 2023-10-28 PROCEDURE — 25000003 PHARM REV CODE 250: Performed by: STUDENT IN AN ORGANIZED HEALTH CARE EDUCATION/TRAINING PROGRAM

## 2023-10-28 PROCEDURE — C1769 GUIDE WIRE: HCPCS | Performed by: UROLOGY

## 2023-10-28 PROCEDURE — 93005 ELECTROCARDIOGRAM TRACING: CPT

## 2023-10-28 PROCEDURE — G0378 HOSPITAL OBSERVATION PER HR: HCPCS

## 2023-10-28 PROCEDURE — 25000003 PHARM REV CODE 250: Performed by: UROLOGY

## 2023-10-28 PROCEDURE — 37000008 HC ANESTHESIA 1ST 15 MINUTES: Performed by: UROLOGY

## 2023-10-28 PROCEDURE — 36415 COLL VENOUS BLD VENIPUNCTURE: CPT | Performed by: STUDENT IN AN ORGANIZED HEALTH CARE EDUCATION/TRAINING PROGRAM

## 2023-10-28 PROCEDURE — 63600175 PHARM REV CODE 636 W HCPCS: Performed by: UROLOGY

## 2023-10-28 PROCEDURE — 25000003 PHARM REV CODE 250: Performed by: NURSE ANESTHETIST, CERTIFIED REGISTERED

## 2023-10-28 PROCEDURE — C2617 STENT, NON-COR, TEM W/O DEL: HCPCS | Performed by: UROLOGY

## 2023-10-28 PROCEDURE — 96376 TX/PRO/DX INJ SAME DRUG ADON: CPT

## 2023-10-28 PROCEDURE — 37000009 HC ANESTHESIA EA ADD 15 MINS: Performed by: UROLOGY

## 2023-10-28 PROCEDURE — 63600175 PHARM REV CODE 636 W HCPCS: Performed by: STUDENT IN AN ORGANIZED HEALTH CARE EDUCATION/TRAINING PROGRAM

## 2023-10-28 PROCEDURE — 52332 CYSTOSCOPY AND TREATMENT: CPT | Mod: LT,,, | Performed by: UROLOGY

## 2023-10-28 PROCEDURE — 93010 EKG 12-LEAD: ICD-10-PCS | Mod: ,,, | Performed by: INTERNAL MEDICINE

## 2023-10-28 PROCEDURE — 99214 PR OFFICE/OUTPT VISIT, EST, LEVL IV, 30-39 MIN: ICD-10-PCS | Mod: 25,,, | Performed by: UROLOGY

## 2023-10-28 PROCEDURE — 71000033 HC RECOVERY, INTIAL HOUR: Performed by: UROLOGY

## 2023-10-28 PROCEDURE — 36000707: Performed by: UROLOGY

## 2023-10-28 PROCEDURE — 93010 ELECTROCARDIOGRAM REPORT: CPT | Mod: ,,, | Performed by: INTERNAL MEDICINE

## 2023-10-28 PROCEDURE — 52332 PR CYSTOSCOPY,INSERT URETERAL STENT: ICD-10-PCS | Mod: LT,,, | Performed by: UROLOGY

## 2023-10-28 PROCEDURE — 36000706: Performed by: UROLOGY

## 2023-10-28 PROCEDURE — D9220A PRA ANESTHESIA: Mod: ,,, | Performed by: ANESTHESIOLOGY

## 2023-10-28 PROCEDURE — 80053 COMPREHEN METABOLIC PANEL: CPT | Performed by: STUDENT IN AN ORGANIZED HEALTH CARE EDUCATION/TRAINING PROGRAM

## 2023-10-28 DEVICE — STENT URET PERCUFLEX 6FR 22CM
Type: IMPLANTABLE DEVICE | Site: URETER | Status: NON-FUNCTIONAL
Removed: 2024-02-07

## 2023-10-28 RX ORDER — HYDROMORPHONE HYDROCHLORIDE 1 MG/ML
0.5 INJECTION, SOLUTION INTRAMUSCULAR; INTRAVENOUS; SUBCUTANEOUS EVERY 6 HOURS PRN
Status: DISCONTINUED | OUTPATIENT
Start: 2023-10-28 | End: 2023-10-28

## 2023-10-28 RX ORDER — KETOROLAC TROMETHAMINE 30 MG/ML
15 INJECTION, SOLUTION INTRAMUSCULAR; INTRAVENOUS EVERY 6 HOURS PRN
Status: DISCONTINUED | OUTPATIENT
Start: 2023-10-28 | End: 2023-10-29

## 2023-10-28 RX ORDER — OXYBUTYNIN CHLORIDE 5 MG/1
5 TABLET ORAL 3 TIMES DAILY PRN
Status: DISCONTINUED | OUTPATIENT
Start: 2023-10-28 | End: 2023-10-29 | Stop reason: HOSPADM

## 2023-10-28 RX ORDER — SODIUM CHLORIDE 0.9 % (FLUSH) 0.9 %
10 SYRINGE (ML) INJECTION
Status: DISCONTINUED | OUTPATIENT
Start: 2023-10-28 | End: 2023-10-28 | Stop reason: HOSPADM

## 2023-10-28 RX ORDER — MIDAZOLAM HYDROCHLORIDE 1 MG/ML
INJECTION, SOLUTION INTRAMUSCULAR; INTRAVENOUS
Status: DISCONTINUED | OUTPATIENT
Start: 2023-10-28 | End: 2023-10-28

## 2023-10-28 RX ORDER — CIPROFLOXACIN 2 MG/ML
400 INJECTION, SOLUTION INTRAVENOUS
Status: DISCONTINUED | OUTPATIENT
Start: 2023-10-29 | End: 2023-10-29 | Stop reason: HOSPADM

## 2023-10-28 RX ORDER — ONDANSETRON 2 MG/ML
INJECTION INTRAMUSCULAR; INTRAVENOUS
Status: DISCONTINUED | OUTPATIENT
Start: 2023-10-28 | End: 2023-10-28

## 2023-10-28 RX ORDER — LIDOCAINE HYDROCHLORIDE 20 MG/ML
INJECTION INTRAVENOUS
Status: DISCONTINUED | OUTPATIENT
Start: 2023-10-28 | End: 2023-10-28

## 2023-10-28 RX ORDER — ONDANSETRON 2 MG/ML
4 INJECTION INTRAMUSCULAR; INTRAVENOUS DAILY PRN
Status: DISCONTINUED | OUTPATIENT
Start: 2023-10-28 | End: 2023-10-28 | Stop reason: HOSPADM

## 2023-10-28 RX ORDER — HYDROCODONE BITARTRATE AND ACETAMINOPHEN 5; 325 MG/1; MG/1
1 TABLET ORAL EVERY 4 HOURS PRN
Status: DISCONTINUED | OUTPATIENT
Start: 2023-10-28 | End: 2023-10-29 | Stop reason: HOSPADM

## 2023-10-28 RX ORDER — PROPOFOL 10 MG/ML
VIAL (ML) INTRAVENOUS
Status: DISCONTINUED | OUTPATIENT
Start: 2023-10-28 | End: 2023-10-28

## 2023-10-28 RX ORDER — FENTANYL CITRATE 50 UG/ML
INJECTION, SOLUTION INTRAMUSCULAR; INTRAVENOUS
Status: DISCONTINUED | OUTPATIENT
Start: 2023-10-28 | End: 2023-10-28

## 2023-10-28 RX ORDER — DEXAMETHASONE SODIUM PHOSPHATE 4 MG/ML
INJECTION, SOLUTION INTRA-ARTICULAR; INTRALESIONAL; INTRAMUSCULAR; INTRAVENOUS; SOFT TISSUE
Status: DISCONTINUED | OUTPATIENT
Start: 2023-10-28 | End: 2023-10-28

## 2023-10-28 RX ORDER — CIPROFLOXACIN 2 MG/ML
400 INJECTION, SOLUTION INTRAVENOUS ONCE
Status: COMPLETED | OUTPATIENT
Start: 2023-10-28 | End: 2023-10-28

## 2023-10-28 RX ORDER — SUCCINYLCHOLINE CHLORIDE 20 MG/ML
INJECTION INTRAMUSCULAR; INTRAVENOUS
Status: DISCONTINUED | OUTPATIENT
Start: 2023-10-28 | End: 2023-10-28

## 2023-10-28 RX ORDER — HYDROCODONE BITARTRATE AND ACETAMINOPHEN 10; 325 MG/1; MG/1
1 TABLET ORAL EVERY 4 HOURS PRN
Status: DISCONTINUED | OUTPATIENT
Start: 2023-10-28 | End: 2023-10-29

## 2023-10-28 RX ORDER — HYDROMORPHONE HYDROCHLORIDE 2 MG/ML
0.2 INJECTION, SOLUTION INTRAMUSCULAR; INTRAVENOUS; SUBCUTANEOUS EVERY 5 MIN PRN
Status: DISCONTINUED | OUTPATIENT
Start: 2023-10-28 | End: 2023-10-28 | Stop reason: HOSPADM

## 2023-10-28 RX ADMIN — FENTANYL CITRATE 50 MCG: 50 INJECTION, SOLUTION INTRAMUSCULAR; INTRAVENOUS at 01:10

## 2023-10-28 RX ADMIN — HYDROCODONE BITARTRATE AND ACETAMINOPHEN 1 TABLET: 10; 325 TABLET ORAL at 11:10

## 2023-10-28 RX ADMIN — ONDANSETRON 4 MG: 2 INJECTION, SOLUTION INTRAMUSCULAR; INTRAVENOUS at 01:10

## 2023-10-28 RX ADMIN — SODIUM CHLORIDE, SODIUM LACTATE, POTASSIUM CHLORIDE, AND CALCIUM CHLORIDE: .6; .31; .03; .02 INJECTION, SOLUTION INTRAVENOUS at 01:10

## 2023-10-28 RX ADMIN — DEXAMETHASONE SODIUM PHOSPHATE 4 MG: 4 INJECTION, SOLUTION INTRAMUSCULAR; INTRAVENOUS at 01:10

## 2023-10-28 RX ADMIN — MIDAZOLAM HYDROCHLORIDE 2 MG: 1 INJECTION, SOLUTION INTRAMUSCULAR; INTRAVENOUS at 01:10

## 2023-10-28 RX ADMIN — SODIUM CHLORIDE: 9 INJECTION, SOLUTION INTRAVENOUS at 08:10

## 2023-10-28 RX ADMIN — PROPOFOL 50 MG: 10 INJECTION, EMULSION INTRAVENOUS at 01:10

## 2023-10-28 RX ADMIN — FLUOXETINE 20 MG: 20 CAPSULE ORAL at 08:10

## 2023-10-28 RX ADMIN — PROPOFOL 200 MG: 10 INJECTION, EMULSION INTRAVENOUS at 01:10

## 2023-10-28 RX ADMIN — HYDROCODONE BITARTRATE AND ACETAMINOPHEN 1 TABLET: 10; 325 TABLET ORAL at 05:10

## 2023-10-28 RX ADMIN — SUCCINYLCHOLINE CHLORIDE 140 MG: 20 INJECTION, SOLUTION INTRAMUSCULAR; INTRAVENOUS at 01:10

## 2023-10-28 RX ADMIN — KETOROLAC TROMETHAMINE 15 MG: 30 INJECTION, SOLUTION INTRAMUSCULAR; INTRAVENOUS at 07:10

## 2023-10-28 RX ADMIN — CIPROFLOXACIN 400 MG: 2 INJECTION, SOLUTION INTRAVENOUS at 01:10

## 2023-10-28 RX ADMIN — HYDROCODONE BITARTRATE AND ACETAMINOPHEN 1 TABLET: 10; 325 TABLET ORAL at 04:10

## 2023-10-28 RX ADMIN — HYDROCODONE BITARTRATE AND ACETAMINOPHEN 1 TABLET: 10; 325 TABLET ORAL at 10:10

## 2023-10-28 RX ADMIN — TAMSULOSIN HYDROCHLORIDE 0.4 MG: 0.4 CAPSULE ORAL at 08:10

## 2023-10-28 RX ADMIN — LIDOCAINE HYDROCHLORIDE 100 MG: 20 INJECTION, SOLUTION INTRAVENOUS at 01:10

## 2023-10-28 NOTE — OP NOTE
Operative Note       Surgery Date: 10/28/2023     Surgeon: Maurice Jessica MD    Assistant Surgeon: None    Pre-op Diagnosis:  Nephrolithiasis [N20.0]    Post-op Diagnosis: Post-Op Diagnosis Codes:     * Nephrolithiasis [N20.0]    Procedures:  Procedure(s) (LRB):  CYSTOSCOPY, WITH URETERAL STENT INSERTION (Left)    Anesthesia: General    Indications for Procedure:  The patient is a 48 y.o. year old female with distal left ureteral stone with intractable pain.  She elects to proceed with cysto and ureteral stent placement.  The full risks and benefits of the procedure have been explained and detail and she wishes to proceed.    Procedure Description:  The patient was brought to the operative suite and placed under general anesthesia. She was placed in lithotomy position and prepped and draped in usual sterile fashion.  Time out was performed prior to starting the procedure.    The stone was visible in the distal ureter on fluoroscopy. Cystoscopy was performed using a 22 Turkmen rigid cystoscope.  The left ureteral orifice was identified and cannulated with a Sensor guidewire under fluoroscopic guidance.  The wire easily passed the stone and was seen to advance into the renal pelvis .      The guidewire was then exchanged for a 6x22 double-J ureteral stent under direct vision and fluoroscopic guidance.  Good coils were confirmed in both the renal pelvis and bladder.  Purulent urine was noted to drain from the kidney upon stent placement. The string was removed from the stent prior to placement.   The bladder was drained and the patient was awoken and transferred to PACU in stable condition.       Complications: No    Estimated Blood Loss: 0cc         Specimens Removed:   Specimen (24h ago, onward)      None            Implants:   Implant Name Type Inv. Item Serial No.  Lot No. LRB No. Used Action   STENT URET PERCUFLEX 6FR 22CM - [id number]  STENT URET PERCUFLEX 6FR 22CM  [employer] [id number] Left 1  Implanted              Disposition: PACU - hemodynamically stable.           Condition: Good    Attestation:  I performed the procedure.

## 2023-10-28 NOTE — NURSING
[facility]  Nurses Note -- 4 Eyes        10/27/2023    Skin assessed on: Admit      [x] No Pressure Injuries Present    []Prevention Measures Documented    [] Yes LDA  for Pressure Injury Previously documented     [] Yes New Pressure Injury Discovered   [] LDA for New Pressure Injury Added      Attending :  Shakila Booth LPN     Second Staff/PCT: JU Julio

## 2023-10-28 NOTE — SUBJECTIVE & OBJECTIVE
Interval History:  No acute overnight events.  Patient remained afebrile.  No nausea vomiting.  Continues to have severe left flank pain and left abdominal pain.    Review of Systems   Constitutional:  Positive for activity change and appetite change. Negative for chills, fatigue and fever.   Respiratory:  Negative for cough and shortness of breath.    Cardiovascular:  Negative for chest pain, palpitations and leg swelling.   Gastrointestinal:  Positive for abdominal pain. Negative for abdominal distention, blood in stool, diarrhea, nausea and vomiting.   Genitourinary:  Positive for flank pain and hematuria. Negative for difficulty urinating and dysuria.   Musculoskeletal:  Positive for back pain.   Neurological:  Negative for dizziness, weakness and headaches.     Objective:     Vital Signs (Most Recent):  Temp: 98.6 °F (37 °C) (10/28/23 1141)  Pulse: 91 (10/28/23 1141)  Resp: 19 (10/28/23 1141)  BP: (!) 161/84 (10/28/23 1141)  SpO2: 95 % (10/28/23 1141) Vital Signs (24h Range):  Temp:  [98.4 °F (36.9 °C)-98.9 °F (37.2 °C)] 98.6 °F (37 °C)  Pulse:  [] 91  Resp:  [16-20] 19  SpO2:  [94 %-96 %] 95 %  BP: (130-161)/(65-84) 161/84     Weight: 94.2 kg (207 lb 10.8 oz)  Body mass index is 39.24 kg/m².  No intake or output data in the 24 hours ending 10/28/23 1152      Physical Exam  Vitals and nursing note reviewed.   Constitutional:       General: She is not in acute distress.     Appearance: She is obese. She is not ill-appearing.   HENT:      Mouth/Throat:      Mouth: Mucous membranes are dry.   Eyes:      Extraocular Movements: Extraocular movements intact.   Cardiovascular:      Rate and Rhythm: Normal rate and regular rhythm.      Heart sounds: No murmur heard.  Pulmonary:      Effort: Pulmonary effort is normal. No respiratory distress.      Breath sounds: Normal breath sounds. No wheezing.   Abdominal:      General: There is no distension.      Palpations: Abdomen is soft.      Tenderness: There is no  abdominal tenderness. There is left CVA tenderness. There is no right CVA tenderness.   Musculoskeletal:         General: No swelling or tenderness.   Skin:     General: Skin is warm and dry.   Neurological:      General: No focal deficit present.      Mental Status: She is alert and oriented to person, place, and time.   Psychiatric:         Thought Content: Thought content normal.             Significant Labs: All pertinent labs within the past 24 hours have been reviewed.    Significant Imaging: I have reviewed all pertinent imaging results/findings within the past 24 hours.

## 2023-10-28 NOTE — ASSESSMENT & PLAN NOTE
Body mass index is 39.24 kg/m². Morbid obesity complicates all aspects of disease management from diagnostic modalities to treatment. Weight loss encouraged and health benefits explained to patient.

## 2023-10-28 NOTE — HOSPITAL COURSE
48-year-old female who past medical history of hypertension admitted on 10/27/23 for nephrolithiasis and transaminitis. Pt with 6 mm distal left ureteral calculus with associated mild hydronephrosis and hydroureter on imaging. Urology consulted- s/p cystoscopy with left urethral stent insertion on 10/28/23. Purulent urine was noted to drain from the kidney upon stent placement. LFTs trended down. Hepatitis panel non reactive. pain better control. Pt states her back pain is much better. Pt denies any fever, headaches, vision changes, chest pain, shortness of breath, palpitations, nausea, vomiting, or any new weaknesses. Feels ready to go home. Patient's exam on discharge was as follow: Patient is alert and oriented, appears in no acute distress, heart with regular rate and rhythm, lungs clear to asculation with non-labored breathing, abdomen soft, and no new weaknesses or focal deficits seen. Bilateral lower extremities without any edema or calf tenderness.     Patient was counseled regarding any abnormal labs, differential diagnosis, treatment options, risk-benefit, lifestyle changes, prognosis, current condition, and medications. Patient was interactive and attentive.  Patient's questions were answered in a respectful and timely manner. Patient was instructed to follow-up with PCP within 1 week and to continue taking medications as prescribed.  Instructed to also follow up with urology outpatient. Also, extensively discussed the risks, benefits, and side effects of patient's medications. Discussed with patient about any medication changes. Patient verbalized understanding and agrees to treatment plan.  Patient is stable for discharge.  Patient has no other questions or concerns at this time.  ED precautions discussed with the patient.    Vital signs are stable. Ambulating without any difficulty. Tolerating p.o. intake without any nausea or vomiting. Afebrile for over 24 hours. Patient is in stable condition and has  no questions or concerns. Patient will be discharge to home once transportation secured . Prescriptions sent to pharmacy.  CM/SW to assist with discharge planning.     Vitals:    10/29/23 0309 10/29/23 0514 10/29/23 0643 10/29/23 0732   BP:  (!) 170/83  (!) 166/84   BP Location:  Left arm     Patient Position:  Lying     Pulse:  67  69   Resp: 17 18 17 20   Temp:  98.4 °F (36.9 °C)  98.3 °F (36.8 °C)   TempSrc:  Oral     SpO2:  95%  95%   Weight:  94.2 kg (207 lb 10.8 oz)     Height:

## 2023-10-28 NOTE — NURSING
Patient is awake, alert and fully oriented with spouse and son at bedside.  Maintained NPO.   Note that patient completed chlorhexidine wipe and linen changed in preparation for cysto and stent placement.   PRN hydrocodone-acetaminophen  given at 10:39 for 9/10 pelvic, radiating to back pain.  New IV (20g) to right hand placed by Luis Tim.  Left IV removed as it was infiltrated.     Surgery came to transport to procedure.     Will continue to f/u.

## 2023-10-28 NOTE — ASSESSMENT & PLAN NOTE
-AST/ALT elevated at 69/112 on admission  -CT urogram showed fatty liver  -Hepatitis panel ordered: nonreactive  -LFTs trending down   -Monitor LFTs

## 2023-10-28 NOTE — PLAN OF CARE
DAGOBERTO spoke with patient daughter via phone. DAGOBERTO explained to patient daughter that patient has a discharge order in and would a family member be able to provide transportation for patient? Patient daughter stated she will have patient spouse  patient today. DAGOBERTO also explained to patient daughter to have patient request for a urology referral at the time of PCP follow up appointment. Patient daughter voiced understanding.

## 2023-10-28 NOTE — NURSING
[facility]  Nurses Note -- 4 Eyes      10/28/2023       Skin assessed on: Q Shift      [x] No Pressure Injuries Present    [x]Prevention Measures Documented    [] Yes LDA  for Pressure Injury Previously documented     [] Yes New Pressure Injury Discovered   [] LDA for New Pressure Injury Added      Attending RN:  Little Snyder RN     Second RN:  FAITH Jhaveri

## 2023-10-28 NOTE — PROGRESS NOTES
[facility] Medicine  Progress Note    Patient Name: Hue Cruz  MRN: [id number]  Patient Class: OP- Observation   Admission Date: 10/27/2023  Length of Stay: 0 days  Attending Physician: Purvi Moore DO  Primary Care Provider: Chloé Davis FNP        Subjective:     Principal Problem:Nephrolithiasis        HPI:   48-year-old female who past medical history of hypertension, GERD, nephrolithiasis transferred to [facility] from [facility] for nephrolithiasis, left hydronephrosis, and transaminitis.  Patient complained of left flank pain radiating to left lower abdomen for the past 2 days.  Pain is sharp, constant, and associated with nausea and vomiting.  Had decreased p.o. intake.  Denies any dysuria, increasing urinary urgency or frequency, difficulty urinating.    CT abdomen/pelvis showing 6 mm distal left ureteral calculus with associated left sided mild hydronephrosis and hydroureter.  2-3 mm nonobstructing right renal calculus. Patient given fluids, flomax, analgesics in the ED with minimal relief.  Patient admitted to hospital medicine for further evaluation and urology consult.     line use.  : Dona [id number]      Overview/Hospital Course:  48-year-old female who past medical history of hypertension admitted on 10/27/23 for nephrolithiasis and transaminitis. Pt with 6 mm distal left ureteral calculus with associated mild hydronephrosis and hydroureter on imaging. Urology consulted- plan for surgical intervention on 10/28/23. LFTs trending down. Hepatitis panel non reactive. Continue pain control.       Interval History:  No acute overnight events.  Patient remained afebrile.  No nausea vomiting.  Continues to have severe left flank pain and left abdominal pain.    Review of Systems   Constitutional:  Positive for activity change and appetite change. Negative for chills, fatigue and fever.   Respiratory:  Negative for cough and shortness  of breath.    Cardiovascular:  Negative for chest pain, palpitations and leg swelling.   Gastrointestinal:  Positive for abdominal pain. Negative for abdominal distention, blood in stool, diarrhea, nausea and vomiting.   Genitourinary:  Positive for flank pain and hematuria. Negative for difficulty urinating and dysuria.   Musculoskeletal:  Positive for back pain.   Neurological:  Negative for dizziness, weakness and headaches.     Objective:     Vital Signs (Most Recent):  Temp: 98.6 °F (37 °C) (10/28/23 1141)  Pulse: 91 (10/28/23 1141)  Resp: 19 (10/28/23 1141)  BP: (!) 161/84 (10/28/23 1141)  SpO2: 95 % (10/28/23 1141) Vital Signs (24h Range):  Temp:  [98.4 °F (36.9 °C)-98.9 °F (37.2 °C)] 98.6 °F (37 °C)  Pulse:  [] 91  Resp:  [16-20] 19  SpO2:  [94 %-96 %] 95 %  BP: (130-161)/(65-84) 161/84     Weight: 94.2 kg (207 lb 10.8 oz)  Body mass index is 39.24 kg/m².  No intake or output data in the 24 hours ending 10/28/23 1152      Physical Exam  Vitals and nursing note reviewed.   Constitutional:       General: She is not in acute distress.     Appearance: She is obese. She is not ill-appearing.   HENT:      Mouth/Throat:      Mouth: Mucous membranes are dry.   Eyes:      Extraocular Movements: Extraocular movements intact.   Cardiovascular:      Rate and Rhythm: Normal rate and regular rhythm.      Heart sounds: No murmur heard.  Pulmonary:      Effort: Pulmonary effort is normal. No respiratory distress.      Breath sounds: Normal breath sounds. No wheezing.   Abdominal:      General: There is no distension.      Palpations: Abdomen is soft.      Tenderness: There is no abdominal tenderness. There is left CVA tenderness. There is no right CVA tenderness.   Musculoskeletal:         General: No swelling or tenderness.   Skin:     General: Skin is warm and dry.   Neurological:      General: No focal deficit present.      Mental Status: She is alert and oriented to person, place, and time.   Psychiatric:          Thought Content: Thought content normal.             Significant Labs: All pertinent labs within the past 24 hours have been reviewed.    Significant Imaging: I have reviewed all pertinent imaging results/findings within the past 24 hours.      Assessment/Plan:      * Nephrolithiasis  -presented with 2 days of left flank pain associated with nausea vomiting.  No fevers or UTI symptoms.  -CT renal study: The 6 mm distal left ureteral calculus with associated mild hydronephrosis and hydroureter. 2-3 mm nonobstructing right renal calculus  -Continue IVF and flomax  -Pain control   -Urology consulted: plan for intervention today. Likely can discharge after procedure       Transaminitis  -AST/ALT elevated at 69/112 on admission  -CT urogram showed fatty liver  -Hepatitis panel ordered: nonreactive  -LFTs trending down   -Monitor LFTs    Hypertension  -home meds: lisinopril-HCTZ 20-12.5mg  -BP controlled at this time  -Will hold home meds.  -Monitor BP    Depression  Patient has persistent depression which is mild and is currently controlled. Will Continue anti-depressant medications. We will not consult psychiatry at this time. Patient does not display psychosis at this time. Continue to monitor closely and adjust plan of care as needed.    -Resume home fluoxetine 20mg     Class 1 obesity without serious comorbidity with body mass index (BMI) of 30.0 to 30.9 in adult  Body mass index is 39.24 kg/m². Morbid obesity complicates all aspects of disease management from diagnostic modalities to treatment. Weight loss encouraged and health benefits explained to patient.           VTE Risk Mitigation (From admission, onward)         Ordered     IP VTE HIGH RISK PATIENT  Once         10/27/23 0935     Place sequential compression device  Until discontinued         10/27/23 0935                Discharge Planning   GAURI: 10/28/2023     Code Status: Full Code   Is the patient medically ready for discharge?:     Reason for patient  still in hospital (select all that apply): Patient trending condition, Treatment and Consult recommendations  Discharge Plan A: Home with family   Discharge Delays: None known at this time              Purvi Moore DO  Department of Hospital Medicine   [facility]

## 2023-10-28 NOTE — ANESTHESIA POSTPROCEDURE EVALUATION
Anesthesia Post Evaluation    Patient: Hue Cruz    Procedure(s) Performed: Procedure(s) (LRB):  CYSTOSCOPY, WITH URETERAL STENT INSERTION (Left)    Final Anesthesia Type: general      Patient location during evaluation: PACU  Patient participation: Yes- Able to Participate  Level of consciousness: awake and alert  Post-procedure vital signs: reviewed and stable  Pain management: adequate  Airway patency: patent    PONV status at discharge: No PONV  Anesthetic complications: no      Cardiovascular status: hemodynamically stable  Respiratory status: unassisted and spontaneous ventilation  Hydration status: euvolemic  Follow-up not needed.          Vitals Value Taken Time   /78 10/28/23 1431   Temp 36.5 °C (97.7 °F) 10/28/23 1415   Pulse 90 10/28/23 1444   Resp 19 10/28/23 1444   SpO2 96 % 10/28/23 1444   Vitals shown include unvalidated device data.      No case tracking events are documented in the log.      Pain/Rahel Score: Pain Rating Prior to Med Admin: 10 (10/28/2023 10:39 AM)  Pain Rating Post Med Admin: 0 (10/28/2023  2:10 PM)  Rahel Score: 8 (10/28/2023  2:10 PM)

## 2023-10-28 NOTE — NURSING
Note that patient has returned from cystoscopy,with ureteral  stent insertion (left). Family at bedside.   Eyes opened to gentle touch.   Denies pain at this time.   V/S: 164/79, 90, 19 RR, SPO2@93% on RA.     Report given by PACU RN April.     Will continue to f/u.

## 2023-10-28 NOTE — NURSING
Note that patient awake, alert and fully oriented.  C/O 10/10 pelvic pain radiating to her back.   Seen by Urologist, Dr. Jessica this am.   Patient will go for Urology intervention at about noon.  Will maintain NPO, and assist with pain control.

## 2023-10-28 NOTE — PLAN OF CARE
10/28/23 1011   Discharge Planning   Assessment Type Discharge Planning Brief Assessment   Resource/Environmental Concerns none   Support Systems Children;Spouse/significant other;Family members   Equipment Currently Used at Home none   Patient/Family Anticipates Transition to home with family;home   Patient/Family Anticipated Services at Transition none   DME Needed Upon Discharge  none   Discharge Plan A Home with family   Discharge Plan B Home with family

## 2023-10-28 NOTE — ASSESSMENT & PLAN NOTE
-presented with 2 days of left flank pain associated with nausea vomiting.  No fevers or UTI symptoms.  -CT renal study: The 6 mm distal left ureteral calculus with associated mild hydronephrosis and hydroureter. 2-3 mm nonobstructing right renal calculus  -Continue IVF and flomax  -Pain control   -Urology consulted: plan for intervention today. Likely can discharge after procedure

## 2023-10-28 NOTE — ANESTHESIA PREPROCEDURE EVALUATION
10/28/2023  Hue Cruz is a 48 y.o., female.      Pre-op Assessment    I have reviewed the Patient Summary Reports.     I have reviewed the Nursing Notes.       Review of Systems  Anesthesia Hx:  No problems with previous Anesthesia  Denies Family Hx of Anesthesia complications.   Denies Personal Hx of Anesthesia complications.   Social:  Non-Smoker    Cardiovascular:   Hypertension Denies MI.   Denies Dysrhythmias.     Pulmonary:  Pulmonary Normal    Renal/:   Chronic Renal Disease renal calculi    Hepatic/GI:   GERD No current symptoms   Neurological:  Neurology Normal    Endocrine:  Endocrine Normal    Psych:   Psychiatric History depression          Physical Exam  General: Well nourished, Cooperative, Alert and Oriented    Airway:  Mallampati: III   Mouth Opening: Normal  TM Distance: 4 - 6 cm  Tongue: Normal  Neck ROM: Normal ROM    Dental:    Chest/Lungs:  Normal Respiratory Rate, Clear to auscultation    Heart:  Rate: Normal  Rhythm: Regular Rhythm      Wt Readings from Last 3 Encounters:   10/28/23 94.2 kg (207 lb 10.8 oz)   10/26/23 72.1 kg (159 lb)   09/03/23 72 kg (158 lb 11.7 oz)     Temp Readings from Last 3 Encounters:   10/28/23 36.9 °C (98.4 °F)   10/26/23 37.1 °C (98.7 °F) (Oral)   09/06/23 37.3 °C (99.2 °F) (Oral)     BP Readings from Last 3 Encounters:   10/28/23 130/73   10/27/23 130/80   09/06/23 113/64     Pulse Readings from Last 3 Encounters:   10/28/23 88   10/27/23 79   09/06/23 87     Lab Results   Component Value Date    WBC 8.16 10/27/2023    HGB 13.0 10/27/2023    HCT 39.8 10/27/2023    MCV 89 10/27/2023     10/27/2023       CMP  Sodium   Date Value Ref Range Status   10/28/2023 140 136 - 145 mmol/L Final     Potassium   Date Value Ref Range Status   10/28/2023 3.8 3.5 - 5.1 mmol/L Final     Chloride   Date Value Ref Range Status   10/28/2023 109 95 - 110  mmol/L Final     CO2   Date Value Ref Range Status   10/28/2023 24 23 - 29 mmol/L Final     Glucose   Date Value Ref Range Status   10/28/2023 110 70 - 110 mg/dL Final     BUN   Date Value Ref Range Status   10/28/2023 15 6 - 20 mg/dL Final     Creatinine   Date Value Ref Range Status   10/28/2023 0.8 0.5 - 1.4 mg/dL Final     Calcium   Date Value Ref Range Status   10/28/2023 8.6 (L) 8.7 - 10.5 mg/dL Final     Total Protein   Date Value Ref Range Status   10/28/2023 6.6 6.0 - 8.4 g/dL Final     Albumin   Date Value Ref Range Status   10/28/2023 3.2 (L) 3.5 - 5.2 g/dL Final     Total Bilirubin   Date Value Ref Range Status   10/28/2023 0.5 0.1 - 1.0 mg/dL Final     Comment:     For infants and newborns, interpretation of results should be based  on gestational age, weight and in agreement with clinical  observations.    Premature Infant recommended reference ranges:  Up to 24 hours.............<8.0 mg/dL  Up to 48 hours............<12.0 mg/dL  3-5 days..................<15.0 mg/dL  6-29 days.................<15.0 mg/dL       Alkaline Phosphatase   Date Value Ref Range Status   10/28/2023 106 55 - 135 U/L Final     AST   Date Value Ref Range Status   10/28/2023 45 (H) 10 - 40 U/L Final     ALT   Date Value Ref Range Status   10/28/2023 76 (H) 10 - 44 U/L Final     Anion Gap   Date Value Ref Range Status   10/28/2023 7 (L) 8 - 16 mmol/L Final     eGFR   Date Value Ref Range Status   10/28/2023 >60 >60 mL/min/1.73 m^2 Final     10/26/2023 UPT negative    Anesthesia Plan  Type of Anesthesia, risks & benefits discussed:    Anesthesia Type: Gen ETT  Intra-op Monitoring Plan: Standard ASA Monitors  Post Op Pain Control Plan: multimodal analgesia and IV/PO Opioids PRN  Induction:  IV  Informed Consent: Informed consent signed with the Patient and all parties understand the risks and agree with anesthesia plan.  All questions answered.   ASA Score: 2  Day of Surgery Review of History & Physical: H&P Update referred to the  surgeon/provider.  Anesthesia Plan Notes: H&P and informed consent obtained with help from Ukrainian interpretor, Yue ID#[id number].     Ready For Surgery From Anesthesia Perspective.     .

## 2023-10-28 NOTE — SUBJECTIVE & OBJECTIVE
Interval History: Reports severe pain this AM and overnight, in left flank radiating into groin. Notes only minimal relief from pain medications.    Review of Systems  Objective:     Temp:  [97.8 °F (36.6 °C)-98.9 °F (37.2 °C)] 98.4 °F (36.9 °C)  Pulse:  [] 88  Resp:  [16-20] 18  SpO2:  [94 %-97 %] 96 %  BP: (130-145)/(63-75) 130/73     Body mass index is 39.24 kg/m².           Drains       None                    Physical Exam  Constitutional:       General: She is not in acute distress.     Appearance: She is well-developed.   HENT:      Head: Normocephalic and atraumatic.   Eyes:      Pupils: Pupils are equal, round, and reactive to light.   Cardiovascular:      Rate and Rhythm: Normal rate and regular rhythm.   Pulmonary:      Effort: Pulmonary effort is normal. No respiratory distress.   Musculoskeletal:      Cervical back: Normal range of motion and neck supple.   Neurological:      Mental Status: She is alert and oriented to person, place, and time.   Psychiatric:         Behavior: Behavior normal.         Thought Content: Thought content normal.           Significant Labs:    BMP:  Recent Labs   Lab 10/26/23  1615 10/27/23  0949 10/28/23  0457    140 140   K 3.7 4.2 3.8    106 109   CO2 24 25 24   BUN 13 11 15   CREATININE 0.8 1.0 0.8   CALCIUM 9.5 9.1 8.6*       CBC:   Recent Labs   Lab 10/26/23  1615 10/27/23  0949   WBC 11.57 8.16   HGB 13.6 13.0   HCT 40.6 39.8    219     Significant Imaging:  Results for orders placed during the hospital encounter of 05/02/23    CT Renal Stone Study ABD Pelvis WO    Narrative  EXAMINATION:  CT RENAL STONE STUDY ABD PELVIS WO    CLINICAL HISTORY:  Flank pain, kidney stone suspected; Calculus of kidney    TECHNIQUE:  Axial images were obtained of the abdomen and pelvis from the hemidiaphragms through the symphysis pubis without the use of intravenous or oral contrast.  The lack of intravenous contrast limits the examination in evaluating for  parenchymal organ and vascular abnormalities.    COMPARISON:  This examination was correlated with a prior study from March 24, 2023.    FINDINGS:  Lower thorax:    The visualized portions of the lung bases are clear.  There are no pleural effusions.    Abdomen and pelvis:    The liver is diffusely diminished in attenuation consistent with fibrofatty change.  There are metallic clips in the region of the gallbladder fossa compatible with a prior cholecystectomy.    The spleen, pancreas and adrenal glands are unremarkable.  There is bilateral nephrolithiasis without evidence for hydronephrosis.    A focus of parenchymal scarring is noted involving the anterior aspect of the midportion of the right kidney.  The abdominal aorta is normal in caliber.    There are mildly prominent lymph nodes with associated edema in the central portion of the mesentery.  The appendix is normal.    The urinary bladder is unremarkable.  The uterus is in the midline and anteverted.  There is scattered fecal material in the colon.  A small fatty density umbilical hernia is present.    There is no free fluid in the abdomen or pelvis.    Osseous structures:    A transitional vertebra is noted at the lumbosacral junction    Impression  Bilateral nephrolithiasis without evidence for hydronephrosis.    Diffuse fatty change throughout the liver.    Absence of the gallbladder.    Mild degree of edema with associated lymph nodes in the mesentery suggestive of mesenteric lymphadenitis.    Small fatty density umbilical hernia.      Electronically signed by: Too Zafar MD  Date:    05/02/2023  Time:    07:09

## 2023-10-28 NOTE — ASSESSMENT & PLAN NOTE
Discussed cysto and stent placement today vs continued medical management - wishes to proceed with stent placement  She understands she will need FU for further intervention as outpatient in near future - can be done closer to home if she prefers  Continue current pain meds in interim  Should be OK for DC after surgery later today

## 2023-10-28 NOTE — PROGRESS NOTES
[facility] - Telemetry  Urology  Progress Note    Patient Name: Hue Cruz  MRN: [id number]  Admission Date: 10/27/2023  Hospital Length of Stay: 0 days  Code Status: Full Code   Attending Provider: Purvi Moore DO   Primary Care Physician: Chloé Davis FNP    Subjective:     HPI:  Urolithiasis  Patient complains of left abdominal pain with radiation to the abdomen. Onset of symptoms was abrupt starting 2 days ago with controlled course since that time. Patient describes the pain as aching and colicky,  intermittent  and rated as mild and moderate. The patient has had vomiting and no diaphoresis. There has been no fever or chills. The patient is not complaining of dysuria or frequency. Risk factors for urolithiasis: history of stone disease.   Patient transferred from [facility] due to pain.  She currently is pain free.   used.      Interval History: Reports severe pain this AM and overnight, in left flank radiating into groin. Notes only minimal relief from pain medications.    Review of Systems  Objective:     Temp:  [97.8 °F (36.6 °C)-98.9 °F (37.2 °C)] 98.4 °F (36.9 °C)  Pulse:  [] 88  Resp:  [16-20] 18  SpO2:  [94 %-97 %] 96 %  BP: (130-145)/(63-75) 130/73     Body mass index is 39.24 kg/m².           Drains       None                    Physical Exam  Constitutional:       General: She is not in acute distress.     Appearance: She is well-developed.   HENT:      Head: Normocephalic and atraumatic.   Eyes:      Pupils: Pupils are equal, round, and reactive to light.   Cardiovascular:      Rate and Rhythm: Normal rate and regular rhythm.   Pulmonary:      Effort: Pulmonary effort is normal. No respiratory distress.   Musculoskeletal:      Cervical back: Normal range of motion and neck supple.   Neurological:      Mental Status: She is alert and oriented to person, place, and time.   Psychiatric:         Behavior: Behavior normal.         Thought Content:  Thought content normal.           Significant Labs:    BMP:  Recent Labs   Lab 10/26/23  1615 10/27/23  0949 10/28/23  0457    140 140   K 3.7 4.2 3.8    106 109   CO2 24 25 24   BUN 13 11 15   CREATININE 0.8 1.0 0.8   CALCIUM 9.5 9.1 8.6*       CBC:   Recent Labs   Lab 10/26/23  1615 10/27/23  0949   WBC 11.57 8.16   HGB 13.6 13.0   HCT 40.6 39.8    219     Significant Imaging:  Results for orders placed during the hospital encounter of 05/02/23    CT Renal Stone Study ABD Pelvis WO    Narrative  EXAMINATION:  CT RENAL STONE STUDY ABD PELVIS WO    CLINICAL HISTORY:  Flank pain, kidney stone suspected; Calculus of kidney    TECHNIQUE:  Axial images were obtained of the abdomen and pelvis from the hemidiaphragms through the symphysis pubis without the use of intravenous or oral contrast.  The lack of intravenous contrast limits the examination in evaluating for parenchymal organ and vascular abnormalities.    COMPARISON:  This examination was correlated with a prior study from March 24, 2023.    FINDINGS:  Lower thorax:    The visualized portions of the lung bases are clear.  There are no pleural effusions.    Abdomen and pelvis:    The liver is diffusely diminished in attenuation consistent with fibrofatty change.  There are metallic clips in the region of the gallbladder fossa compatible with a prior cholecystectomy.    The spleen, pancreas and adrenal glands are unremarkable.  There is bilateral nephrolithiasis without evidence for hydronephrosis.    A focus of parenchymal scarring is noted involving the anterior aspect of the midportion of the right kidney.  The abdominal aorta is normal in caliber.    There are mildly prominent lymph nodes with associated edema in the central portion of the mesentery.  The appendix is normal.    The urinary bladder is unremarkable.  The uterus is in the midline and anteverted.  There is scattered fecal material in the colon.  A small fatty density umbilical  hernia is present.    There is no free fluid in the abdomen or pelvis.    Osseous structures:    A transitional vertebra is noted at the lumbosacral junction    Impression  Bilateral nephrolithiasis without evidence for hydronephrosis.    Diffuse fatty change throughout the liver.    Absence of the gallbladder.    Mild degree of edema with associated lymph nodes in the mesentery suggestive of mesenteric lymphadenitis.    Small fatty density umbilical hernia.      Electronically signed by: Too Zafar MD  Date:    05/02/2023  Time:    07:09                     Assessment/Plan:     * Nephrolithiasis  Discussed cysto and stent placement today vs continued medical management - wishes to proceed with stent placement  She understands she will need FU for further intervention as outpatient in near future - can be done closer to home if she prefers  Continue current pain meds in interim  Should be OK for DC after surgery later today        VTE Risk Mitigation (From admission, onward)         Ordered     IP VTE HIGH RISK PATIENT  Once         10/27/23 0935     Place sequential compression device  Until discontinued         10/27/23 0935                Piter Jessica MD  Urology  [facility] [place] - Telemetry

## 2023-10-28 NOTE — TRANSFER OF CARE
"Anesthesia Transfer of Care Note    Patient: Hue Cruz    Procedure(s) Performed: Procedure(s) (LRB):  CYSTOSCOPY, WITH URETERAL STENT INSERTION (Left)    Patient location: PACU    Anesthesia Type: general    Transport from OR: Transported from OR on room air with adequate spontaneous ventilation    Post pain: adequate analgesia    Post assessment: no apparent anesthetic complications and tolerated procedure well    Post vital signs: stable    Level of consciousness: awake    Nausea/Vomiting: no nausea/vomiting    Complications: none    Transfer of care protocol was followed      Last vitals:   Visit Vitals  BP (!) 161/84   Pulse 91   Temp 37 °C (98.6 °F)   Resp 19   Ht 5' 1" (1.549 m)   Wt 94.2 kg (207 lb 10.8 oz)   SpO2 95%   Breastfeeding No   BMI 39.24 kg/m²     "

## 2023-10-28 NOTE — PLAN OF CARE
[facility] - Telemetry  Discharge Final Note    Primary Care Provider: Chloé Davis FNP    Expected Discharge Date: 10/28/2023    Final Discharge Note (most recent)       Final Note - 10/28/23 1129          Final Note    Assessment Type Final Discharge Note     Anticipated Discharge Disposition Home or Self Care     What phone number can be called within the next 1-3 days to see how you are doing after discharge? [phone]     Hospital Resources/Appts/Education Provided Appointments scheduled and added to AVS        Post-Acute Status    Discharge Delays None known at this time                     Important Message from Medicare             Contact Info       Chloé Davis FNP   Specialty: Family Medicine   Relationship: PCP - General    [address]  [place] [address]   Phone: [phone]       Next Steps: Schedule an appointment as soon as possible for a visit in 1 week(s)    Instructions: Please asks for a Urology referral at time of Primary Care Physician appointment.          DAGOBERTO spoke with patient daughter via phone. DAGOBERTO explained for patient to request for a urology referral at time of PCP visit. Patient daughter stated family will provide transportation for patient at discharge.     DAGOBERTO secure chat nurse Fitch that patient cleared from case management standpoint.

## 2023-10-28 NOTE — ANESTHESIA PROCEDURE NOTES
Intubation    Date/Time: 10/28/2023 1:43 PM    Performed by: Ankit Pena CRNA  Authorized by: Angel Diop Chi, MD    Intubation:     Induction:  Intravenous    Intubated:  Postinduction    Mask Ventilation:  Easy mask    Attempts:  1    Attempted By:  CRNA    Method of Intubation:  Video laryngoscopy    Blade:  Oneill 3    Laryngeal View Grade: Grade I - full view of cords      Difficult Airway Encountered?: No      Complications:  None    Airway Device:  Oral endotracheal tube    Airway Device Size:  7.0    Style/Cuff Inflation:  Cuffed    Inflation Amount (mL):  5    Tube secured:  20    Secured at:  The lips    Placement Verified By:  Capnometry    Complicating Factors:  Obesity and short neck    Findings Post-Intubation:  BS equal bilateral

## 2023-10-28 NOTE — PLAN OF CARE
Problem: Adult Inpatient Plan of Care  Goal: Optimal Comfort and Wellbeing  Outcome: Met  Intervention: Monitor Pain and Promote Comfort  Flowsheets (Taken 10/28/2023 1809)  Pain Management Interventions:   care clustered   diversional activity provided   around-the-clock dosing utilized   quiet environment facilitated   position adjusted   medication offered  Intervention: Provide Person-Centered Care  Flowsheets (Taken 10/28/2023 1809)  Trust Relationship/Rapport:   care explained   choices provided   emotional support provided

## 2023-10-29 VITALS
TEMPERATURE: 98 F | BODY MASS INDEX: 39.21 KG/M2 | WEIGHT: 207.69 LBS | DIASTOLIC BLOOD PRESSURE: 84 MMHG | OXYGEN SATURATION: 95 % | RESPIRATION RATE: 20 BRPM | HEART RATE: 69 BPM | SYSTOLIC BLOOD PRESSURE: 166 MMHG | HEIGHT: 61 IN

## 2023-10-29 LAB
ALBUMIN SERPL BCP-MCNC: 3.4 G/DL (ref 3.5–5.2)
ALP SERPL-CCNC: 105 U/L (ref 55–135)
ALT SERPL W/O P-5'-P-CCNC: 75 U/L (ref 10–44)
ANION GAP SERPL CALC-SCNC: 9 MMOL/L (ref 8–16)
AST SERPL-CCNC: 40 U/L (ref 10–40)
BASOPHILS NFR BLD: 50 % (ref 0–1.9)
BILIRUB SERPL-MCNC: 0.4 MG/DL (ref 0.1–1)
BUN SERPL-MCNC: 14 MG/DL (ref 6–20)
CALCIUM SERPL-MCNC: 8.9 MG/DL (ref 8.7–10.5)
CHLORIDE SERPL-SCNC: 110 MMOL/L (ref 95–110)
CO2 SERPL-SCNC: 20 MMOL/L (ref 23–29)
CREAT SERPL-MCNC: 0.7 MG/DL (ref 0.5–1.4)
DIFFERENTIAL METHOD: ABNORMAL
EOSINOPHIL NFR BLD: 0 % (ref 0–8)
ERYTHROCYTE [DISTWIDTH] IN BLOOD BY AUTOMATED COUNT: 12.7 % (ref 11.5–14.5)
EST. GFR  (NO RACE VARIABLE): >60 ML/MIN/1.73 M^2
GLUCOSE SERPL-MCNC: 114 MG/DL (ref 70–110)
HCT VFR BLD AUTO: 39.2 % (ref 37–48.5)
HGB BLD-MCNC: 12.4 G/DL (ref 12–16)
IMM GRANULOCYTES # BLD AUTO: ABNORMAL K/UL (ref 0–0.04)
IMM GRANULOCYTES NFR BLD AUTO: ABNORMAL % (ref 0–0.5)
LYMPHOCYTES NFR BLD: 0 % (ref 18–48)
MCH RBC QN AUTO: 29.4 PG (ref 27–31)
MCHC RBC AUTO-ENTMCNC: 31.6 G/DL (ref 32–36)
MCV RBC AUTO: 93 FL (ref 82–98)
MONOCYTES NFR BLD: 0 % (ref 4–15)
NEUTROPHILS NFR BLD: 0 % (ref 38–73)
NEUTS BAND NFR BLD MANUAL: 50 %
NRBC BLD-RTO: 0 /100 WBC
PLATELET # BLD AUTO: 104 K/UL (ref 150–450)
PLATELET BLD QL SMEAR: ABNORMAL
PMV BLD AUTO: 11.9 FL (ref 9.2–12.9)
POTASSIUM SERPL-SCNC: 4 MMOL/L (ref 3.5–5.1)
PROT SERPL-MCNC: 7.2 G/DL (ref 6–8.4)
RBC # BLD AUTO: 4.22 M/UL (ref 4–5.4)
SODIUM SERPL-SCNC: 139 MMOL/L (ref 136–145)
WBC # BLD AUTO: 9.1 K/UL (ref 3.9–12.7)

## 2023-10-29 PROCEDURE — 80053 COMPREHEN METABOLIC PANEL: CPT | Performed by: STUDENT IN AN ORGANIZED HEALTH CARE EDUCATION/TRAINING PROGRAM

## 2023-10-29 PROCEDURE — 96376 TX/PRO/DX INJ SAME DRUG ADON: CPT

## 2023-10-29 PROCEDURE — 25000003 PHARM REV CODE 250: Performed by: UROLOGY

## 2023-10-29 PROCEDURE — 63600175 PHARM REV CODE 636 W HCPCS: Performed by: NURSE PRACTITIONER

## 2023-10-29 PROCEDURE — 85025 COMPLETE CBC W/AUTO DIFF WBC: CPT | Performed by: STUDENT IN AN ORGANIZED HEALTH CARE EDUCATION/TRAINING PROGRAM

## 2023-10-29 PROCEDURE — G0378 HOSPITAL OBSERVATION PER HR: HCPCS

## 2023-10-29 PROCEDURE — 63600175 PHARM REV CODE 636 W HCPCS: Performed by: STUDENT IN AN ORGANIZED HEALTH CARE EDUCATION/TRAINING PROGRAM

## 2023-10-29 PROCEDURE — 36415 COLL VENOUS BLD VENIPUNCTURE: CPT | Performed by: STUDENT IN AN ORGANIZED HEALTH CARE EDUCATION/TRAINING PROGRAM

## 2023-10-29 PROCEDURE — 25000003 PHARM REV CODE 250: Performed by: STUDENT IN AN ORGANIZED HEALTH CARE EDUCATION/TRAINING PROGRAM

## 2023-10-29 PROCEDURE — 63600175 PHARM REV CODE 636 W HCPCS: Performed by: UROLOGY

## 2023-10-29 PROCEDURE — 96365 THER/PROPH/DIAG IV INF INIT: CPT

## 2023-10-29 PROCEDURE — 96375 TX/PRO/DX INJ NEW DRUG ADDON: CPT

## 2023-10-29 RX ORDER — OXYCODONE HYDROCHLORIDE 10 MG/1
10 TABLET ORAL EVERY 8 HOURS PRN
Qty: 9 TABLET | Refills: 0 | Status: SHIPPED | OUTPATIENT
Start: 2023-10-29 | End: 2024-02-07

## 2023-10-29 RX ORDER — MORPHINE SULFATE 4 MG/ML
3 INJECTION, SOLUTION INTRAMUSCULAR; INTRAVENOUS ONCE
Status: COMPLETED | OUTPATIENT
Start: 2023-10-29 | End: 2023-10-29

## 2023-10-29 RX ORDER — OXYCODONE HYDROCHLORIDE 5 MG/1
5 TABLET ORAL EVERY 4 HOURS PRN
Status: DISCONTINUED | OUTPATIENT
Start: 2023-10-29 | End: 2023-10-29 | Stop reason: HOSPADM

## 2023-10-29 RX ORDER — HYDROCHLOROTHIAZIDE 12.5 MG/1
12.5 TABLET ORAL DAILY
Status: DISCONTINUED | OUTPATIENT
Start: 2023-10-29 | End: 2023-10-29 | Stop reason: HOSPADM

## 2023-10-29 RX ORDER — CIPROFLOXACIN 500 MG/1
500 TABLET ORAL EVERY 12 HOURS
Qty: 12 TABLET | Refills: 0 | Status: SHIPPED | OUTPATIENT
Start: 2023-10-29 | End: 2023-11-04

## 2023-10-29 RX ORDER — TAMSULOSIN HYDROCHLORIDE 0.4 MG/1
0.4 CAPSULE ORAL DAILY
Qty: 10 CAPSULE | Refills: 0 | Status: SHIPPED | OUTPATIENT
Start: 2023-10-30 | End: 2024-02-07

## 2023-10-29 RX ORDER — LISINOPRIL 20 MG/1
20 TABLET ORAL DAILY
Status: DISCONTINUED | OUTPATIENT
Start: 2023-10-29 | End: 2023-10-29 | Stop reason: HOSPADM

## 2023-10-29 RX ORDER — OXYBUTYNIN CHLORIDE 5 MG/1
5 TABLET ORAL 3 TIMES DAILY PRN
Qty: 15 TABLET | Refills: 0 | Status: SHIPPED | OUTPATIENT
Start: 2023-10-29 | End: 2024-02-07

## 2023-10-29 RX ADMIN — MORPHINE SULFATE 3 MG: 4 INJECTION, SOLUTION INTRAMUSCULAR; INTRAVENOUS at 03:10

## 2023-10-29 RX ADMIN — LISINOPRIL 20 MG: 20 TABLET ORAL at 09:10

## 2023-10-29 RX ADMIN — HYDROCHLOROTHIAZIDE 12.5 MG: 12.5 TABLET ORAL at 09:10

## 2023-10-29 RX ADMIN — CIPROFLOXACIN 400 MG: 2 INJECTION, SOLUTION INTRAVENOUS at 01:10

## 2023-10-29 RX ADMIN — TAMSULOSIN HYDROCHLORIDE 0.4 MG: 0.4 CAPSULE ORAL at 09:10

## 2023-10-29 RX ADMIN — SODIUM CHLORIDE: 9 INJECTION, SOLUTION INTRAVENOUS at 03:10

## 2023-10-29 RX ADMIN — KETOROLAC TROMETHAMINE 15 MG: 30 INJECTION, SOLUTION INTRAMUSCULAR; INTRAVENOUS at 02:10

## 2023-10-29 RX ADMIN — FLUOXETINE 20 MG: 20 CAPSULE ORAL at 09:10

## 2023-10-29 RX ADMIN — OXYCODONE HYDROCHLORIDE 5 MG: 5 TABLET ORAL at 06:10

## 2023-10-29 NOTE — DISCHARGE SUMMARY
No need. CT more detailed than xray   [facility] Medicine  Discharge Summary      Patient Name: Hue Cruz  MRN: [id number]  CHRISTINA: [id number]  Patient Class: OP- Observation  Admission Date: 10/27/2023  Hospital Length of Stay: 0 days  Discharge Date and Time:  10/29/2023 1:10 PM  Attending Physician: Purvi Moore DO   Discharging Provider: Purvi Moore DO  Primary Care Provider: Chloé Davis FNP    Primary Care Team: Networked reference to record PCT     HPI:    48-year-old female who past medical history of hypertension, GERD, nephrolithiasis transferred to [facility] from [facility] for nephrolithiasis, left hydronephrosis, and transaminitis.  Patient complained of left flank pain radiating to left lower abdomen for the past 2 days.  Pain is sharp, constant, and associated with nausea and vomiting.  Had decreased p.o. intake.  Denies any dysuria, increasing urinary urgency or frequency, difficulty urinating.    CT abdomen/pelvis showing 6 mm distal left ureteral calculus with associated left sided mild hydronephrosis and hydroureter.  2-3 mm nonobstructing right renal calculus. Patient given fluids, flomax, analgesics in the ED with minimal relief.  Patient admitted to hospital medicine for further evaluation and urology consult.     line use.  : Dona [id number]      Procedure(s) (LRB):  CYSTOSCOPY, WITH URETERAL STENT INSERTION (Left)      Hospital Course:   48-year-old female who past medical history of hypertension admitted on 10/27/23 for nephrolithiasis and transaminitis. Pt with 6 mm distal left ureteral calculus with associated mild hydronephrosis and hydroureter on imaging. Urology consulted- s/p cystoscopy with left urethral stent insertion on 10/28/23. Purulent urine was noted to drain from the kidney upon stent placement. LFTs trended down. Hepatitis panel non reactive. pain better control. Pt states her back pain is much better. Pt denies any fever, headaches,  vision changes, chest pain, shortness of breath, palpitations, nausea, vomiting, or any new weaknesses. Feels ready to go home. Patient's exam on discharge was as follow: Patient is alert and oriented, appears in no acute distress, heart with regular rate and rhythm, lungs clear to asculation with non-labored breathing, abdomen soft, and no new weaknesses or focal deficits seen. Bilateral lower extremities without any edema or calf tenderness.     Patient was counseled regarding any abnormal labs, differential diagnosis, treatment options, risk-benefit, lifestyle changes, prognosis, current condition, and medications. Patient was interactive and attentive.  Patient's questions were answered in a respectful and timely manner. Patient was instructed to follow-up with PCP within 1 week and to continue taking medications as prescribed.  Instructed to also follow up with urology outpatient. Also, extensively discussed the risks, benefits, and side effects of patient's medications. Discussed with patient about any medication changes. Patient verbalized understanding and agrees to treatment plan.  Patient is stable for discharge.  Patient has no other questions or concerns at this time.  ED precautions discussed with the patient.    Vital signs are stable. Ambulating without any difficulty. Tolerating p.o. intake without any nausea or vomiting. Afebrile for over 24 hours. Patient is in stable condition and has no questions or concerns. Patient will be discharge to home once transportation secured . Prescriptions sent to pharmacy.  CM/SW to assist with discharge planning.     Vitals:    10/29/23 0309 10/29/23 0514 10/29/23 0643 10/29/23 0732   BP:  (!) 170/83  (!) 166/84   BP Location:  Left arm     Patient Position:  Lying     Pulse:  67  69   Resp: 17 18 17 20   Temp:  98.4 °F (36.9 °C)  98.3 °F (36.8 °C)   TempSrc:  Oral     SpO2:  95%  95%   Weight:  94.2 kg (207 lb 10.8 oz)     Height:                Goals of Care  Treatment Preferences:  Code Status: Full Code      Consults:   Consults (From admission, onward)        Status Ordering Provider     Inpatient consult to Urology  Once        Provider:  Yayo Birmingham MD    Completed VISHAL DENISE.          No new Assessment & Plan notes have been filed under this hospital service since the last note was generated.  Service: Hospital Medicine    Final Active Diagnoses:    Diagnosis Date Noted POA    PRINCIPAL PROBLEM:  Nephrolithiasis [N20.0] 10/27/2023 Yes    Transaminitis [R74.01] 09/03/2023 Yes    Hypertension [I10] 03/27/2023 Yes    Depression [F32.A] 03/27/2023 Yes    Class 1 obesity without serious comorbidity with body mass index (BMI) of 30.0 to 30.9 in adult [E66.9, Z68.30] 10/27/2023 Not Applicable      Problems Resolved During this Admission:       Discharged Condition: stable    Disposition: Home or Self Care    Follow Up:   Follow-up Information     Chloé Davis FNP. Schedule an appointment as soon as possible for a visit in 1 week(s).    Specialty: Family Medicine  Why: Please asks for a Urology referral at time of Primary Care Physician appointment.  Contact information:  [address] [place] [address] [place] [id number] [phone]             Urology Follow up in 1 week(s).                     Patient Instructions:      Diet Cardiac     Diet Cardiac     Notify your health care provider if you experience any of the following:  temperature >100.4     Notify your health care provider if you experience any of the following:  persistent nausea and vomiting or diarrhea     Notify your health care provider if you experience any of the following:  severe uncontrolled pain     Notify your health care provider if you experience any of the following:  increased confusion or weakness     Notify your health care provider if you experience any of the following:  temperature >100.4     Notify your health care provider if you experience any of the following:  persistent  nausea and vomiting or diarrhea     Notify your health care provider if you experience any of the following:  severe uncontrolled pain     Notify your health care provider if you experience any of the following:  increased confusion or weakness     Notify your health care provider if you experience any of the following:  difficulty breathing or increased cough     Activity as tolerated     Activity as tolerated       Significant Diagnostic Studies: Labs: All labs within the past 24 hours have been reviewed       Recent Results (from the past 100 hour(s))   CBC W/ AUTO DIFFERENTIAL    Collection Time: 10/26/23  4:15 PM   Result Value Ref Range    WBC 11.57 3.90 - 12.70 K/uL    RBC 4.60 4.00 - 5.40 M/uL    Hemoglobin 13.6 12.0 - 16.0 g/dL    Hematocrit 40.6 37.0 - 48.5 %    MCV 88 82 - 98 fL    MCH 29.6 27.0 - 31.0 pg    MCHC 33.5 32.0 - 36.0 g/dL    RDW 12.9 11.5 - 14.5 %    Platelets 276 150 - 450 K/uL    MPV 8.9 (L) 9.2 - 12.9 fL    Immature Granulocytes 0.3 0.0 - 0.5 %    Gran # (ANC) 8.7 (H) 1.8 - 7.7 K/uL    Immature Grans (Abs) 0.04 0.00 - 0.04 K/uL    Lymph # 1.9 1.0 - 4.8 K/uL    Mono # 0.8 0.3 - 1.0 K/uL    Eos # 0.1 0.0 - 0.5 K/uL    Baso # 0.03 0.00 - 0.20 K/uL    nRBC 0 0 /100 WBC    Gran % 74.9 (H) 38.0 - 73.0 %    Lymph % 16.0 (L) 18.0 - 48.0 %    Mono % 7.3 4.0 - 15.0 %    Eosinophil % 1.2 0.0 - 8.0 %    Basophil % 0.3 0.0 - 1.9 %    Differential Method Automated    Comp. Metabolic Panel    Collection Time: 10/26/23  4:15 PM   Result Value Ref Range    Sodium 142 136 - 145 mmol/L    Potassium 3.7 3.5 - 5.1 mmol/L    Chloride 108 95 - 110 mmol/L    CO2 24 23 - 29 mmol/L    Glucose 100 70 - 110 mg/dL    BUN 13 6 - 20 mg/dL    Creatinine 0.8 0.5 - 1.4 mg/dL    Calcium 9.5 8.7 - 10.5 mg/dL    Total Protein 8.1 6.0 - 8.4 g/dL    Albumin 4.2 3.5 - 5.2 g/dL    Total Bilirubin 0.5 0.1 - 1.0 mg/dL    Alkaline Phosphatase 138 (H) 55 - 135 U/L    AST 69 (H) 10 - 40 U/L     (H) 10 - 44 U/L    eGFR >60.0 >60  mL/min/1.73 m^2    Anion Gap 10 8 - 16 mmol/L   Lipase    Collection Time: 10/26/23  4:15 PM   Result Value Ref Range    Lipase 41 4 - 60 U/L   Urinalysis, Reflex to Urine Culture Urine, Clean Catch    Collection Time: 10/26/23  4:20 PM    Specimen: Urine   Result Value Ref Range    Specimen UA Urine, Clean Catch     Color, UA Yellow Yellow, Straw, Trista    Appearance, UA Hazy (A) Clear    pH, UA 6.0 5.0 - 8.0    Specific Gravity, UA 1.020 1.005 - 1.030    Protein, UA 1+ (A) Negative    Glucose, UA Negative Negative    Ketones, UA Negative Negative    Bilirubin (UA) Negative Negative    Occult Blood UA 3+ (A) Negative    Nitrite, UA Negative Negative    Leukocytes, UA Negative Negative   Urinalysis Microscopic    Collection Time: 10/26/23  4:20 PM   Result Value Ref Range    RBC, UA 80 (H) 0 - 4 /hpf    WBC, UA 3 0 - 5 /hpf    Bacteria Occasional None-Occ /hpf    Squam Epithel, UA 5 /hpf    Hyaline Casts, UA 0 0-1/lpf /lpf    Ca Oxalate Jordyn, UA Few None-Moderate    Microscopic Comment SEE COMMENT    POCT urine pregnancy    Collection Time: 10/26/23  4:23 PM   Result Value Ref Range    POC Preg Test, Ur Negative Negative     Acceptable Yes    CBC Auto Differential    Collection Time: 10/27/23  9:49 AM   Result Value Ref Range    WBC 8.16 3.90 - 12.70 K/uL    RBC 4.49 4.00 - 5.40 M/uL    Hemoglobin 13.0 12.0 - 16.0 g/dL    Hematocrit 39.8 37.0 - 48.5 %    MCV 89 82 - 98 fL    MCH 29.0 27.0 - 31.0 pg    MCHC 32.7 32.0 - 36.0 g/dL    RDW 12.7 11.5 - 14.5 %    Platelets 219 150 - 450 K/uL    MPV 9.0 (L) 9.2 - 12.9 fL    Immature Granulocytes 0.4 0.0 - 0.5 %    Gran # (ANC) 5.8 1.8 - 7.7 K/uL    Immature Grans (Abs) 0.03 0.00 - 0.04 K/uL    Lymph # 1.5 1.0 - 4.8 K/uL    Mono # 0.7 0.3 - 1.0 K/uL    Eos # 0.1 0.0 - 0.5 K/uL    Baso # 0.01 0.00 - 0.20 K/uL    nRBC 0 0 /100 WBC    Gran % 71.1 38.0 - 73.0 %    Lymph % 18.5 18.0 - 48.0 %    Mono % 8.8 4.0 - 15.0 %    Eosinophil % 1.1 0.0 - 8.0 %    Basophil %  0.1 0.0 - 1.9 %    Differential Method Automated    Comprehensive Metabolic Panel    Collection Time: 10/27/23  9:49 AM   Result Value Ref Range    Sodium 140 136 - 145 mmol/L    Potassium 4.2 3.5 - 5.1 mmol/L    Chloride 106 95 - 110 mmol/L    CO2 25 23 - 29 mmol/L    Glucose 105 70 - 110 mg/dL    BUN 11 6 - 20 mg/dL    Creatinine 1.0 0.5 - 1.4 mg/dL    Calcium 9.1 8.7 - 10.5 mg/dL    Total Protein 7.5 6.0 - 8.4 g/dL    Albumin 3.8 3.5 - 5.2 g/dL    Total Bilirubin 0.8 0.1 - 1.0 mg/dL    Alkaline Phosphatase 128 55 - 135 U/L    AST 69 (H) 10 - 40 U/L     (H) 10 - 44 U/L    eGFR >60 >60 mL/min/1.73 m^2    Anion Gap 9 8 - 16 mmol/L   Hepatitis panel, acute    Collection Time: 10/27/23  9:49 AM   Result Value Ref Range    Hepatitis B Surface Ag Non-reactive Non-reactive    Hep B C IgM Non-reactive Non-reactive    Hep A IgM Non-reactive Non-reactive    Hepatitis C Ab Non-reactive Non-reactive   Comprehensive Metabolic Panel (CMP)    Collection Time: 10/28/23  4:57 AM   Result Value Ref Range    Sodium 140 136 - 145 mmol/L    Potassium 3.8 3.5 - 5.1 mmol/L    Chloride 109 95 - 110 mmol/L    CO2 24 23 - 29 mmol/L    Glucose 110 70 - 110 mg/dL    BUN 15 6 - 20 mg/dL    Creatinine 0.8 0.5 - 1.4 mg/dL    Calcium 8.6 (L) 8.7 - 10.5 mg/dL    Total Protein 6.6 6.0 - 8.4 g/dL    Albumin 3.2 (L) 3.5 - 5.2 g/dL    Total Bilirubin 0.5 0.1 - 1.0 mg/dL    Alkaline Phosphatase 106 55 - 135 U/L    AST 45 (H) 10 - 40 U/L    ALT 76 (H) 10 - 44 U/L    eGFR >60 >60 mL/min/1.73 m^2    Anion Gap 7 (L) 8 - 16 mmol/L   Comprehensive Metabolic Panel (CMP)    Collection Time: 10/29/23  5:06 AM   Result Value Ref Range    Sodium 139 136 - 145 mmol/L    Potassium 4.0 3.5 - 5.1 mmol/L    Chloride 110 95 - 110 mmol/L    CO2 20 (L) 23 - 29 mmol/L    Glucose 114 (H) 70 - 110 mg/dL    BUN 14 6 - 20 mg/dL    Creatinine 0.7 0.5 - 1.4 mg/dL    Calcium 8.9 8.7 - 10.5 mg/dL    Total Protein 7.2 6.0 - 8.4 g/dL    Albumin 3.4 (L) 3.5 - 5.2 g/dL     Total Bilirubin 0.4 0.1 - 1.0 mg/dL    Alkaline Phosphatase 105 55 - 135 U/L    AST 40 10 - 40 U/L    ALT 75 (H) 10 - 44 U/L    eGFR >60 >60 mL/min/1.73 m^2    Anion Gap 9 8 - 16 mmol/L   CBC Auto Differential    Collection Time: 10/29/23  6:25 AM   Result Value Ref Range    WBC 9.10 3.90 - 12.70 K/uL    RBC 4.22 4.00 - 5.40 M/uL    Hemoglobin 12.4 12.0 - 16.0 g/dL    Hematocrit 39.2 37.0 - 48.5 %    MCV 93 82 - 98 fL    MCH 29.4 27.0 - 31.0 pg    MCHC 31.6 (L) 32.0 - 36.0 g/dL    RDW 12.7 11.5 - 14.5 %    Platelets 104 (L) 150 - 450 K/uL    MPV 11.9 9.2 - 12.9 fL    Immature Granulocytes CANCELED 0.0 - 0.5 %    Immature Grans (Abs) CANCELED 0.00 - 0.04 K/uL    nRBC 0 0 /100 WBC    Gran % 0.0 (L) 38.0 - 73.0 %    Lymph % 0.0 (L) 18.0 - 48.0 %    Mono % 0.0 (L) 4.0 - 15.0 %    Eosinophil % 0.0 0.0 - 8.0 %    Basophil % 50.0 (H) 0.0 - 1.9 %    Bands 50.0 %    Platelet Estimate Clumped (A)     Differential Method Automated        Microbiology Results (last 7 days)     ** No results found for the last 168 hours. **                  Pending Diagnostic Studies:     Procedure Component Value Units Date/Time    EKG 12-lead [[id number]]     Order Status: Sent Lab Status: No result          Medications:  Reconciled Home Medications:      Medication List      START taking these medications    ciprofloxacin HCl 500 MG tablet  Commonly known as: CIPRO  Take 1 tablet (500 mg total) by mouth every 12 (twelve) hours. for 6 days     oxybutynin 5 MG Tab  Commonly known as: DITROPAN  Take 1 tablet (5 mg total) by mouth 3 (three) times daily as needed (Bladder spasms).     oxyCODONE 10 mg Tab immediate release tablet  Commonly known as: ROXICODONE  Take 1 tablet (10 mg total) by mouth every 8 (eight) hours as needed for Pain.     tamsulosin 0.4 mg Cap  Commonly known as: FLOMAX  Take 1 capsule (0.4 mg total) by mouth once daily. for 10 days  Start taking on: October 30, 2023        CONTINUE taking these medications    FLUoxetine 20 MG  capsule  Take 1 capsule (20 mg total) by mouth once daily. for 14 days     lisinopriL-hydrochlorothiazide 20-12.5 mg per tablet  Commonly known as: PRINZIDE,ZESTORETIC  Take 1 tablet by mouth once daily. Hold until seen by PCP in 1-2 weeks d/t acute illness.            Indwelling Lines/Drains at time of discharge:   Lines/Drains/Airways     Drain  Duration                Ureteral Drain/Stent 10/28/23 1351 Left ureter 6 Fr. <1 day                Time spent on the discharge of patient: Greater than 35 minutes         Purvi Moore DO  Department of Hospital Medicine  [facility] - Telemetry

## 2023-10-29 NOTE — PLAN OF CARE
Patient alert and oriented x4. Patient Greek speaking.  utilized throughout shift as needed. 02 at 2l. Respirations even and unlabored. Denies sob on exertion. Skin warm and dry. Iv fluids infusing with no complications noted. Iv antibiotics given as ordered. Patient complains of pain. Pain medication given as needed. Scds to ble. Patient has no complaints at this time. Instructed to call for any needs. Bed in lowest position. Call bell within reach.     Problem: Adult Inpatient Plan of Care  Goal: Plan of Care Review  Outcome: Ongoing, Progressing  Flowsheets (Taken 10/29/2023 0221)  Plan of Care Reviewed With: patient  Goal: Patient-Specific Goal (Individualized)  Outcome: Ongoing, Progressing  Goal: Absence of Hospital-Acquired Illness or Injury  Outcome: Ongoing, Progressing  Intervention: Identify and Manage Fall Risk  Flowsheets (Taken 10/29/2023 0221)  Safety Promotion/Fall Prevention:   assistive device/personal item within reach   bed alarm refused   high risk medications identified   medications reviewed   nonskid shoes/socks when out of bed     Problem: Pain Acute  Goal: Acceptable Pain Control and Functional Ability  Outcome: Ongoing, Progressing  Intervention: Develop Pain Management Plan  Flowsheets (Taken 10/29/2023 0222)  Pain Management Interventions:   care clustered   pain management plan reviewed with patient/caregiver

## 2023-10-29 NOTE — NURSING
Patient complaining of severe pain. Patient stating that po pain pills are not helping with pain at all. Notified Nila Tran Np at this time. New orders given for one time dose of morphine.

## 2023-10-29 NOTE — NURSING
[facility] [place]  Nurses Note -- 4 Eyes      10/28/23      Skin assessed on: Q Shift      [x] No Pressure Injuries Present    [x]Prevention Measures Documented    [] Yes LDA  for Pressure Injury Previously documented     [] Yes New Pressure Injury Discovered   [] LDA for New Pressure Injury Added      Attending RN:  Marium Whittaker RN     Second RN:  Little Snyder Rn

## 2023-10-29 NOTE — PLAN OF CARE
All needs are met. DAGOBERTO notified nurse Gabby via secure message that patient is clear for discharge from case management standpoint.       10/29/23 1051   Final Note   Assessment Type Final Discharge Note   Anticipated Discharge Disposition Home   What phone number can be called within the next 1-3 days to see how you are doing after discharge? [phone]   Hospital Resources/Appts/Education Provided Appointments scheduled and added to AVS   Post-Acute Status   Discharge Delays None known at this time

## 2023-12-11 PROBLEM — N12 PYELONEPHRITIS: Status: RESOLVED | Noted: 2023-09-03 | Resolved: 2023-12-11

## 2023-12-18 ENCOUNTER — PATIENT MESSAGE (OUTPATIENT)
Dept: ADMINISTRATIVE | Facility: HOSPITAL | Age: 48
End: 2023-12-18

## 2024-05-06 PROBLEM — N12 PYELONEPHRITIS: Status: RESOLVED | Noted: 2023-09-03 | Resolved: 2024-05-06

## 2024-05-21 PROBLEM — R73.03 PRE-DIABETES: Status: ACTIVE | Noted: 2024-05-21

## 2024-05-21 PROBLEM — K76.0 FATTY LIVER: Status: ACTIVE | Noted: 2024-05-21

## 2024-05-21 PROBLEM — E66.813 CLASS 3 SEVERE OBESITY WITH BODY MASS INDEX (BMI) OF 40.0 TO 44.9 IN ADULT: Status: ACTIVE | Noted: 2023-10-27

## 2024-05-21 PROBLEM — E66.01 CLASS 3 SEVERE OBESITY WITH BODY MASS INDEX (BMI) OF 40.0 TO 44.9 IN ADULT: Status: ACTIVE | Noted: 2023-10-27

## 2024-11-26 ENCOUNTER — PATIENT MESSAGE (OUTPATIENT)
Dept: ADMINISTRATIVE | Facility: HOSPITAL | Age: 49
End: 2024-11-26

## (undated) DEVICE — SENSOR DUAL FLEX STR 150CM

## (undated) DEVICE — GLOVE SURG BIOGEL LATEX SZ 7.5

## (undated) DEVICE — MAT QUICK 40X30 FLOOR FLUID LF

## (undated) DEVICE — Device

## (undated) DEVICE — COVER SNAP KAP 26IN

## (undated) DEVICE — GAUZE SPONGE 4X4 12PLY

## (undated) DEVICE — SOL NACL IRR 3000ML

## (undated) DEVICE — SUPPORT ULNA NERVE PROTECTOR